# Patient Record
Sex: FEMALE | Race: WHITE | NOT HISPANIC OR LATINO | Employment: FULL TIME | URBAN - METROPOLITAN AREA
[De-identification: names, ages, dates, MRNs, and addresses within clinical notes are randomized per-mention and may not be internally consistent; named-entity substitution may affect disease eponyms.]

---

## 2017-09-15 ENCOUNTER — OFFICE VISIT (OUTPATIENT)
Dept: RETAIL CLINIC | Facility: CLINIC | Age: 37
End: 2017-09-15

## 2017-09-15 VITALS
WEIGHT: 209 LBS | BODY MASS INDEX: 38.46 KG/M2 | RESPIRATION RATE: 16 BRPM | HEIGHT: 62 IN | SYSTOLIC BLOOD PRESSURE: 104 MMHG | DIASTOLIC BLOOD PRESSURE: 64 MMHG | OXYGEN SATURATION: 99 % | TEMPERATURE: 98.5 F | HEART RATE: 87 BPM

## 2017-09-15 DIAGNOSIS — J06.9 UPPER RESPIRATORY INFECTION, VIRAL: Primary | ICD-10-CM

## 2017-09-15 LAB
EXPIRATION DATE: NORMAL
EXPIRATION DATE: NORMAL
FLUAV AG NPH QL: NEGATIVE
FLUBV AG NPH QL: NEGATIVE
INTERNAL CONTROL: NORMAL
INTERNAL CONTROL: NORMAL
Lab: NORMAL
Lab: NORMAL
S PYO AG THROAT QL: NEGATIVE

## 2017-09-15 PROCEDURE — 99213 OFFICE O/P EST LOW 20 MIN: CPT | Performed by: NURSE PRACTITIONER

## 2017-09-15 PROCEDURE — 87880 STREP A ASSAY W/OPTIC: CPT | Performed by: NURSE PRACTITIONER

## 2017-09-15 PROCEDURE — 87804 INFLUENZA ASSAY W/OPTIC: CPT | Performed by: NURSE PRACTITIONER

## 2017-09-15 RX ORDER — IBUPROFEN 800 MG/1
800 TABLET ORAL EVERY 6 HOURS PRN
Qty: 30 TABLET | Refills: 0 | Status: SHIPPED | OUTPATIENT
Start: 2017-09-15 | End: 2019-10-25

## 2017-09-15 RX ORDER — LANSOPRAZOLE 30 MG/1
30 CAPSULE, DELAYED RELEASE ORAL DAILY
COMMUNITY

## 2017-09-15 NOTE — PROGRESS NOTES
"Subjective   Roberta Casillas is a 37 y.o. female.   Chief Complaint   Patient presents with   • Sore Throat      headaches      Sore Throat    This is a new problem. The current episode started yesterday. The problem has been waxing and waning. There has been no fever. The pain is at a severity of 5/10. Pertinent negatives include no congestion, trouble swallowing or vomiting. Associated symptoms comments: Body aches. She has tried acetaminophen for the symptoms. The treatment provided no relief.        The following portions of the patient's history were reviewed and updated as appropriate: allergies, current medications, past family history, past medical history, past social history, past surgical history and problem list.    Current Outpatient Prescriptions:   •  Citalopram Hydrobromide (CELEXA PO), Take 40 mg by mouth., Disp: , Rfl:   •  Fexofenadine HCl (ALLEGRA ALLERGY PO), Take  by mouth., Disp: , Rfl:   •  lansoprazole (PREVACID) 30 MG capsule, Take 30 mg by mouth Daily., Disp: , Rfl:   •  fluticasone (FLONASE) 50 MCG/ACT nasal spray, 2 sprays into each nostril Daily., Disp: , Rfl:   •  ibuprofen (ADVIL,MOTRIN) 800 MG tablet, Take 1 tablet by mouth Every 6 (Six) Hours As Needed for Mild Pain , Fever or Headache., Disp: 30 tablet, Rfl: 0  •  Norgestim-Eth Estrad Triphasic (ORTHO TRI-CYCLEN, 28, PO), Take  by mouth., Disp: , Rfl:     Review of Systems   Constitutional: Positive for activity change, appetite change and fatigue. Negative for fever.   HENT: Positive for sore throat. Negative for congestion, postnasal drip, rhinorrhea, sinus pressure, sneezing and trouble swallowing.    Eyes: Negative for redness.   Respiratory: Negative.    Cardiovascular: Negative.    Gastrointestinal: Negative for nausea and vomiting.   Musculoskeletal: Positive for myalgias.   Skin: Negative for rash.     /64  Pulse 87  Temp 98.5 °F (36.9 °C)  Resp 16  Ht 62\" (157.5 cm)  Wt 209 lb (94.8 kg)  LMP 08/30/2017  SpO2 " 99%  BMI 38.23 kg/m2    Objective   Allergies   Allergen Reactions   • Lidocaine Hives       Physical Exam   Constitutional: She is oriented to person, place, and time. She appears well-developed and well-nourished.   HENT:   Head: Normocephalic.   Right Ear: External ear normal.   Left Ear: External ear normal.   Nose: Nose normal.   Mouth/Throat: Oropharynx is clear and moist. No oropharyngeal exudate.   Eyes: Conjunctivae are normal.   Neck: Normal range of motion. Neck supple.   Cardiovascular: Normal rate, regular rhythm and normal heart sounds.    Pulmonary/Chest: Effort normal and breath sounds normal. No respiratory distress. She has no wheezes. She has no rales. She exhibits no tenderness.   Musculoskeletal: Normal range of motion.   Neurological: She is alert and oriented to person, place, and time.   Skin: Skin is warm.   Psychiatric: She has a normal mood and affect. Her behavior is normal.   Vitals reviewed.      Assessment/Plan   Roberta was seen today for sore throat.    Diagnoses and all orders for this visit:    Upper respiratory infection, viral  -     POC Rapid Strep A  -     POC Influenza A / B    Other orders  -     ibuprofen (ADVIL,MOTRIN) 800 MG tablet; Take 1 tablet by mouth Every 6 (Six) Hours As Needed for Mild Pain , Fever or Headache.      Results for orders placed or performed in visit on 09/15/17   POC Rapid Strep A   Result Value Ref Range    Rapid Strep A Screen Negative Negative, VALID, INVALID, Not Performed    Internal Control Passed Passed    Lot Number [id number]     Expiration Date 2/23/19    POC Influenza A / B   Result Value Ref Range    Rapid Influenza A Ag Negative     Rapid Influenza B Ag Negative     Internal Control Passed Passed    Lot Number [id number]     Expiration Date 11/18           An After Visit Summary was printed, reviewed, and given to the patient. Understanding verbalized and agrees with treatment plan.  If no improvement or becomes worse, follow up with primary  or go to [facility]/ER.        September 15, 2017 7:09 PM

## 2017-09-15 NOTE — PATIENT INSTRUCTIONS
"Upper Respiratory Infection, Adult  Most upper respiratory infections (URIs) are caused by a virus. A URI affects the nose, throat, and upper air passages. The most common type of URI is often called \"the common cold.\"  HOME CARE   · Take medicines only as told by your doctor.  · Gargle warm saltwater or take cough drops to comfort your throat as told by your doctor.  · Use a warm mist humidifier or inhale steam from a shower to increase air moisture. This may make it easier to breathe.  · Drink enough fluid to keep your pee (urine) clear or pale yellow.  · Eat soups and other clear broths.  · Have a healthy diet.  · Rest as needed.  · Go back to work when your fever is gone or your doctor says it is okay.  ¨ You may need to stay home longer to avoid giving your URI to others.  ¨ You can also wear a face mask and wash your hands often to prevent spread of the virus.  · Use your inhaler more if you have asthma.  · Do not use any tobacco products, including cigarettes, chewing tobacco, or electronic cigarettes. If you need help quitting, ask your doctor.  GET HELP IF:  · You are getting worse, not better.  · Your symptoms are not helped by medicine.  · You have chills.  · You are getting more short of breath.  · You have brown or red mucus.  · You have yellow or brown discharge from your nose.  · You have pain in your face, especially when you bend forward.  · You have a fever.  · You have puffy (swollen) neck glands.  · You have pain while swallowing.  · You have white areas in the back of your throat.  GET HELP RIGHT AWAY IF:   · You have very bad or constant:    Headache.    Ear pain.    Pain in your forehead, behind your eyes, and over your cheekbones (sinus pain).    Chest pain.  · You have long-lasting (chronic) lung disease and any of the following:    Wheezing.    Long-lasting cough.    Coughing up blood.    A change in your usual mucus.  · You have a stiff neck.  · You have changes in your:    Vision.   "  Hearing.    Thinking.    Mood.  MAKE SURE YOU:   · Understand these instructions.  · Will watch your condition.  · Will get help right away if you are not doing well or get worse.     This information is not intended to replace advice given to you by your health care provider. Make sure you discuss any questions you have with your health care provider.     Document Released: 06/05/2009 Document Revised: 05/03/2016 Document Reviewed: 03/25/2015  [employer] Interactive Patient Education ©2017 [employer]

## 2018-10-26 ENCOUNTER — OFFICE VISIT (OUTPATIENT)
Dept: ENDOCRINOLOGY | Facility: CLINIC | Age: 38
End: 2018-10-26

## 2018-10-26 VITALS
HEART RATE: 72 BPM | WEIGHT: 222 LBS | HEIGHT: 62 IN | BODY MASS INDEX: 40.85 KG/M2 | DIASTOLIC BLOOD PRESSURE: 62 MMHG | OXYGEN SATURATION: 98 % | SYSTOLIC BLOOD PRESSURE: 110 MMHG

## 2018-10-26 DIAGNOSIS — E04.2 MULTINODULAR GOITER: Primary | ICD-10-CM

## 2018-10-26 LAB
T4 FREE SERPL-MCNC: 1.04 NG/DL (ref 0.89–1.76)
TSH SERPL DL<=0.05 MIU/L-ACNC: 1.3 MIU/ML (ref 0.35–5.35)

## 2018-10-26 PROCEDURE — 76536 US EXAM OF HEAD AND NECK: CPT | Performed by: INTERNAL MEDICINE

## 2018-10-26 PROCEDURE — 76942 ECHO GUIDE FOR BIOPSY: CPT | Performed by: INTERNAL MEDICINE

## 2018-10-26 PROCEDURE — 84439 ASSAY OF FREE THYROXINE: CPT | Performed by: INTERNAL MEDICINE

## 2018-10-26 PROCEDURE — 84443 ASSAY THYROID STIM HORMONE: CPT | Performed by: INTERNAL MEDICINE

## 2018-10-26 PROCEDURE — 10022 PR FINE NEEDLE ASP;W/IMAGING GUIDANCE: CPT | Performed by: INTERNAL MEDICINE

## 2018-10-26 PROCEDURE — 86376 MICROSOMAL ANTIBODY EACH: CPT | Performed by: INTERNAL MEDICINE

## 2018-10-26 PROCEDURE — 99244 OFF/OP CNSLTJ NEW/EST MOD 40: CPT | Performed by: INTERNAL MEDICINE

## 2018-10-26 NOTE — PROGRESS NOTES
Thyroid Nodule (Consult for Thyroid Nodule/U/S/FNA)    Subjective   Roberta Casillas is a 38 y.o. female. she is being seen for consultation today at the request of Blossom Branch APRN for evaluation of   Thyroid nodule dx in 2018. Ultrasound on 2018 was reviewed and showed 2 complex nodules 3 cm in size. FNA was recommended and patient was referred here.   She has been told about the nodules in the past and noticed that they are growing. Thyroid function was normal in 2018.         History of Present Illness  The following portions of the patient's history were reviewed and updated as appropriate: She  has a past medical history of Acid reflux; Allergic; Ear infection; Sinusitis; and Strep pharyngitis.  She  has a past surgical history that includes Appendectomy and  section.  Her family history includes Colon cancer in her maternal uncle; Diabetes in her maternal uncle; Heart disease in her father; Hyperlipidemia in her father; Thyroid disease in her maternal aunt, maternal grandmother, and mother.  She  reports that she has never smoked. She does not have any smokeless tobacco history on file. Her alcohol and drug histories are not on file.  She is allergic to lidocaine..    Medications:    Current Outpatient Prescriptions:   •  Citalopram Hydrobromide (CELEXA PO), Take 40 mg by mouth., Disp: , Rfl:   •  Fexofenadine HCl (ALLEGRA ALLERGY PO), Take  by mouth., Disp: , Rfl:   •  fluticasone (FLONASE) 50 MCG/ACT nasal spray, 2 sprays into each nostril Daily., Disp: , Rfl:   •  ibuprofen (ADVIL,MOTRIN) 800 MG tablet, Take 1 tablet by mouth Every 6 (Six) Hours As Needed for Mild Pain , Fever or Headache., Disp: 30 tablet, Rfl: 0  •  lansoprazole (PREVACID) 30 MG capsule, Take 30 mg by mouth Daily., Disp: , Rfl:   •  Norgestim-Eth Estrad Triphasic (ORTHO TRI-CYCLEN, 28, PO), Take  by mouth., Disp: , Rfl:     The following portions of the patient's history were reviewed and updated as appropriate:  "allergies, current medications, past family history, past medical history, past social history, past surgical history and problem list.    Review of Systems   Constitutional: Positive for fatigue and unexpected weight gain.   HENT: Positive for sore throat.    Eyes: Positive for itching.   Gastrointestinal: Positive for constipation and GERD.   Endocrine: Positive for heat intolerance.   Neurological: Positive for weakness.   Hematological: Bruises/bleeds easily.   Psychiatric/Behavioral: The patient is nervous/anxious.    All other systems reviewed and are negative.      Objective   Blood pressure 110/62, pulse 72, height 157.5 cm (62\"), weight 101 kg (222 lb), SpO2 98 %.   Physical Exam   Constitutional: She is oriented to person, place, and time. She appears well-developed and well-nourished.   HENT:   Head: Normocephalic and atraumatic.   Eyes: Pupils are equal, round, and reactive to light. Conjunctivae and EOM are normal.   Neck: Thyromegaly (right 3 cm non tender nodule palpated) present.   Cardiovascular: Normal rate, regular rhythm, normal heart sounds and intact distal pulses.    Pulmonary/Chest: Effort normal and breath sounds normal.   Musculoskeletal: She exhibits no edema.   Lymphadenopathy:     She has no cervical adenopathy.   Neurological: She is alert and oriented to person, place, and time.   Psychiatric: She has a normal mood and affect. Thought content normal.         Results for orders placed or performed in visit on 10/26/18   TSH   Result Value Ref Range    TSH 1.304 0.350 - 5.350 mIU/mL   Thyroid Peroxidase Antibody   Result Value Ref Range    Thyroid Peroxidase Antibody 9 0 - 34 IU/mL   T4, Free   Result Value Ref Range    Free T4 1.04 0.89 - 1.76 ng/dL             Thyroid ultrasound 10/26/18              Real time high resolution imaging of the thyroid gland was performed in transverse and longitudinal planes.   Previous images were reviewed and compared to the current appearance to assess " stability.       The right lobe measured 6.36 cm L x 1.37 cm AP x 2.51 cm in TV dimension.    The isthmus measured 0.43 cm in thickness.    The left thyroid lobe measured 4.34 cm L x 0.71 cm AP x 1.21 cm in TV dimension.    Thyroid gland is prominent and contains nodules in the right lobe.    Nodule 1   is located in the right mid lobe and measures 2.99 x 2.1 x 2.54 cm (L X AP X TV).  This nodule is complex solid, heterogeneous, hypoechoic with well-defined margins, and Grade II vascularity on Color Flow Doppler.  It has artifacts:  coma-tail calcifications  The nodule appears to consist of several smaller nodules. It is diffiuclt to differentiate the borders between 2.    No pathologic lymph nodes were seen.    Thyroid Biopsy Procedure Note      Indications: Thyroid Nodule    Anesthesia: ethyl chloride spray    Procedure Details   The procedure, risks and complications have been discussed in detail (including, but not limited to airway compromise, infection, bleeding) with the patient, and the patient has signed consent to the procedure    The neck was prepped in sterile fashion..   Biopsy site: right.  With ultrasound guidance of needle localization,   5  aspirates were obtained with sterile 27 g. Needles.  10 slides were prepared with both air drying and immediate cytology fixative.    A single container with 20 ml of cytology fixative was also used to collect needle and syringe washings.   Specimens sent to pathology.   Additional specimen is collected for molecular analysis and will be sent in the event of undetermined biopsy.     The patient tolerated the procedure without difficulty or complications.       Assessment: Right complex dominant nodule biopsied with u/s guidance.         Assessment/Plan:    Problem List Items Addressed This Visit        Endocrine    Multinodular goiter - Primary    Overview     FNA and aspiration of the cyst 10/26/2018.         Relevant Orders    US Thyroid (Completed)    TSH  (Completed)    Thyroid Peroxidase Antibody (Completed)    T4, Free (Completed)          Old records were reviewed and summarized in HPI section of the note.  Previous ultrasound report was reviewed and compared to current finding.   FNA of the nodule performed.   Thyroid function is repeated and normal TPO ab negative.     Follow-up in 1 year if biopsy is benign/

## 2018-10-28 LAB — THYROPEROXIDASE AB SERPL-ACNC: 9 IU/ML (ref 0–34)

## 2018-11-09 ENCOUNTER — OFFICE VISIT (OUTPATIENT)
Dept: RETAIL CLINIC | Facility: CLINIC | Age: 38
End: 2018-11-09

## 2018-11-09 DIAGNOSIS — Z23 NEED FOR VACCINATION: Primary | ICD-10-CM

## 2018-11-09 NOTE — PROGRESS NOTES
Subjective   Roberta Casillas is a 38 y.o. female.     Reason for Appointment  Vaccine    History of Present Illness  Roberta Casillas requests Influenza vaccine.  She denies current acute illness, denies adverse reaction to vaccines in the past.  She denies allergy to eggs, latex and any component to vaccines.  She denies history of Guillain [place].    Assessments  Roberta was seen today for immunizations.    Diagnoses and all orders for this visit:    Need for vaccination  -     Fluarix/Fluzone/Afluria/FluLaval ([id number])        VIS given.  Pt advised that the most common post vaccine complaint is that of a sore arm at the injection site.  Pt also advised that this is a normal reaction to this vaccine.  If there are other concerns that arise, the patient has been advised to call the clinic or return to the clinic. If the pt has difficulty breathing, the patient has been advised to go to the ER.  The pt has stated understanding.    This document has been electronically signed by PRITI Chase November 9, 2018 4:49 PM

## 2019-10-25 ENCOUNTER — OFFICE VISIT (OUTPATIENT)
Dept: ENDOCRINOLOGY | Facility: CLINIC | Age: 39
End: 2019-10-25

## 2019-10-25 VITALS
WEIGHT: 217.3 LBS | HEIGHT: 62 IN | SYSTOLIC BLOOD PRESSURE: 110 MMHG | BODY MASS INDEX: 39.99 KG/M2 | OXYGEN SATURATION: 98 % | DIASTOLIC BLOOD PRESSURE: 60 MMHG | HEART RATE: 58 BPM

## 2019-10-25 DIAGNOSIS — E04.2 MULTINODULAR GOITER: Primary | ICD-10-CM

## 2019-10-25 PROCEDURE — 99213 OFFICE O/P EST LOW 20 MIN: CPT | Performed by: INTERNAL MEDICINE

## 2019-10-25 PROCEDURE — 76536 US EXAM OF HEAD AND NECK: CPT | Performed by: INTERNAL MEDICINE

## 2019-10-25 RX ORDER — METHOCARBAMOL 500 MG/1
TABLET, FILM COATED ORAL
Refills: 0 | COMMUNITY
Start: 2019-09-29

## 2019-10-25 RX ORDER — CHOLECALCIFEROL (VITAMIN D3) 125 MCG
5000 CAPSULE ORAL DAILY
Refills: 0 | COMMUNITY
Start: 2019-08-08

## 2019-10-25 NOTE — PROGRESS NOTES
Goiter (Follow Up & U/S:  Feels like it may be a little bigger)    Subjective   Roberta Casillas is a 39 y.o. female. she is being seen for follow-up of   Thyroid nodule dx in 2018. Ultrasound on 2018 was reviewed and showed 2 complex nodules 3 cm in size. FNA showed benign nodule.   . Thyroid function was normal.         Goiter   Associated symptoms include fatigue, a sore throat and weakness.     The following portions of the patient's history were reviewed and updated as appropriate: She  has a past medical history of Acid reflux, Allergic, Ear infection, Sinusitis, and Strep pharyngitis.  She  has a past surgical history that includes Appendectomy and  section.  Her family history includes Colon cancer in her maternal uncle; Diabetes in her maternal uncle; Heart disease in her father; Hyperlipidemia in her father; Thyroid disease in her maternal aunt, maternal grandmother, and mother.  She  reports that she has never smoked. She does not have any smokeless tobacco history on file. Her alcohol and drug histories are not on file.  She is allergic to lidocaine..    Medications:    Current Outpatient Medications:   •  Fexofenadine HCl (ALLEGRA ALLERGY PO), Take  by mouth., Disp: , Rfl:   •  fluticasone (FLONASE) 50 MCG/ACT nasal spray, 2 sprays into each nostril Daily., Disp: , Rfl:   •  lansoprazole (PREVACID) 30 MG capsule, Take 30 mg by mouth Daily., Disp: , Rfl:   •  methocarbamol (ROBAXIN) 500 MG tablet, , Disp: , Rfl: 0  •  Norgestim-Eth Estrad Triphasic (ORTHO TRI-CYCLEN, 28, PO), Take  by mouth., Disp: , Rfl:   •  RA VITAMIN D-3 125 MCG (5000 UT) capsule, Take 5,000 Units by mouth Daily., Disp: , Rfl: 0    The following portions of the patient's history were reviewed and updated as appropriate: allergies, current medications, past family history, past medical history, past social history, past surgical history and problem list.    Review of Systems   Constitutional: Positive for fatigue and  "unexpected weight gain.   HENT: Positive for sore throat.    Eyes: Positive for itching.   Gastrointestinal: Positive for constipation and GERD.   Endocrine: Positive for heat intolerance.   Neurological: Positive for weakness.   Hematological: Bruises/bleeds easily.   Psychiatric/Behavioral: The patient is nervous/anxious.    All other systems reviewed and are negative.      Objective   Blood pressure 110/60, pulse 58, height 157.5 cm (62\"), weight 98.6 kg (217 lb 4.8 oz), SpO2 98 %.   Physical Exam   Constitutional: She is oriented to person, place, and time. She appears well-developed and well-nourished.   HENT:   Head: Normocephalic and atraumatic.   Eyes: Conjunctivae and EOM are normal. Pupils are equal, round, and reactive to light.   Neck: Thyromegaly (right 3 cm non tender nodule palpated) present.   Cardiovascular: Normal rate, regular rhythm, normal heart sounds and intact distal pulses.   Pulmonary/Chest: Effort normal and breath sounds normal.   Musculoskeletal: She exhibits no edema.   Lymphadenopathy:     She has no cervical adenopathy.   Neurological: She is alert and oriented to person, place, and time.   Psychiatric: She has a normal mood and affect. Thought content normal.         Results for orders placed or performed in visit on 10/26/18   TSH   Result Value Ref Range    TSH 1.304 0.350 - 5.350 mIU/mL   Thyroid Peroxidase Antibody   Result Value Ref Range    Thyroid Peroxidase Antibody 9 0 - 34 IU/mL   T4, Free   Result Value Ref Range    Free T4 1.04 0.89 - 1.76 ng/dL             Thyroid ultrasound 10/26/19              Real time high resolution imaging of the thyroid gland was performed in transverse and longitudinal planes.   Previous images were reviewed and compared to the current appearance to assess stability.       The right lobe measured 5.43 cm L x 2.76 cm AP x 2.78 cm in TV dimension.    The isthmus measured 0.56 cm in thickness.    The left thyroid lobe measured 4.39 cm L x 1.2 cm AP " x 1.39 cm in TV dimension.    Thyroid gland is prominent and contains nodules in the right lobe.    Nodule 1   is located in the right mid lobe and measures 3.32 x 2.51 x 2.68 cm (L X AP X TV).  This nodule is complex solid, heterogeneous, hypoechoic with well-defined margins, and Grade II vascularity on Color Flow Doppler.  It has artifacts:  coma-tail calcifications  The nodule appears to consist of several smaller nodules. It is diffiuclt to differentiate the borders between 2.    No pathologic lymph nodes were seen.    Assessment: Right complex dominant nodule is slowly increased in size. FNA was benign last visit.         Assessment/Plan:    Problem List Items Addressed This Visit        Endocrine    Multinodular goiter - Primary    Overview     FNA and aspiration of the cyst 10/26/2018. Cytology showed benign nodule.          Relevant Orders    US Thyroid (Completed)        I have reviewed therapy options with the patient and explained that if nodule continues to grow surgical removal could be an optimal therapy.   Patient would like for now to continue conservative therapy and we will return to this conversation at her next visit.   Follow-up in 1 year

## 2020-09-29 ENCOUNTER — TELEPHONE (OUTPATIENT)
Dept: ENDOCRINOLOGY | Facility: CLINIC | Age: 40
End: 2020-09-29

## 2020-09-29 NOTE — TELEPHONE ENCOUNTER
Dr. Rivera - this patient had an appointment scheduled on 10/28 but you will be on a Zoom meeting at that time. This is for a follow up + us and the patient is wanting to know if she can be worked in next week since she will be on Fall break for school. Please advise. Thanks.

## 2020-10-07 ENCOUNTER — TELEPHONE (OUTPATIENT)
Dept: ENDOCRINOLOGY | Facility: CLINIC | Age: 40
End: 2020-10-07

## 2020-10-07 ENCOUNTER — OFFICE VISIT (OUTPATIENT)
Dept: ENDOCRINOLOGY | Facility: CLINIC | Age: 40
End: 2020-10-07

## 2020-10-07 VITALS
WEIGHT: 223.5 LBS | SYSTOLIC BLOOD PRESSURE: 112 MMHG | HEART RATE: 84 BPM | BODY MASS INDEX: 41.13 KG/M2 | HEIGHT: 62 IN | DIASTOLIC BLOOD PRESSURE: 62 MMHG | OXYGEN SATURATION: 98 %

## 2020-10-07 DIAGNOSIS — E04.2 MULTINODULAR GOITER: Primary | ICD-10-CM

## 2020-10-07 PROCEDURE — 99213 OFFICE O/P EST LOW 20 MIN: CPT | Performed by: INTERNAL MEDICINE

## 2020-10-07 PROCEDURE — 76536 US EXAM OF HEAD AND NECK: CPT | Performed by: INTERNAL MEDICINE

## 2020-10-07 RX ORDER — SERTRALINE HYDROCHLORIDE 25 MG/1
25 TABLET, FILM COATED ORAL DAILY
COMMUNITY
Start: 2020-07-29

## 2020-10-07 NOTE — PROGRESS NOTES
Goiter (Follow Up)    Subjective   Roberta Casillas is a 40 y.o. female. she is being seen for follow-up of   Thyroid nodule dx in 2018. Ultrasound on 2018 was reviewed and showed  complex nodule 3 cm in size. FNA showed benign nodule.  The nodule remained stable since then   Thyroid function was normal. She had it done recently with PCP and we requested the records.       [facility]   The following portions of the patient's history were reviewed and updated as appropriate: She  has a past medical history of Acid reflux, Allergic, Ear infection, Sinusitis, and Strep pharyngitis.  She  has a past surgical history that includes Appendectomy and  section.  Her family history includes Colon cancer in her maternal uncle; Diabetes in her maternal uncle; Heart disease in her father; Hyperlipidemia in her father; Thyroid disease in her maternal aunt, maternal grandmother, and mother.  She  reports that she has never smoked. She does not have any smokeless tobacco history on file. No history on file for alcohol and drug.  She is allergic to lidocaine..    Medications:    Current Outpatient Medications:   •  Fexofenadine HCl (ALLEGRA ALLERGY PO), Take  by mouth., Disp: , Rfl:   •  fluticasone (FLONASE) 50 MCG/ACT nasal spray, 2 sprays into each nostril Daily., Disp: , Rfl:   •  lansoprazole (PREVACID) 30 MG capsule, Take 30 mg by mouth Daily., Disp: , Rfl:   •  methocarbamol (ROBAXIN) 500 MG tablet, , Disp: , Rfl: 0  •  Norgestim-Eth Estrad Triphasic (ORTHO TRI-CYCLEN, 28, PO), Take  by mouth., Disp: , Rfl:   •  RA VITAMIN D-3 125 MCG (5000 UT) capsule, Take 5,000 Units by mouth Daily., Disp: , Rfl: 0  •  sertraline (ZOLOFT) 25 MG tablet, Take 25 mg by mouth Daily., Disp: , Rfl:     The following portions of the patient's history were reviewed and updated as appropriate: allergies, current medications, past family history, past medical history, past social history, past surgical history and problem list.    Review of  "Systems   Constitutional: Positive for fatigue and unexpected weight gain.   Eyes: Positive for itching.   Gastrointestinal: Positive for constipation and GERD.   Endocrine: Positive for heat intolerance.   Neurological: Positive for weakness.   Hematological: Bruises/bleeds easily.   Psychiatric/Behavioral: The patient is nervous/anxious.    All other systems reviewed and are negative.      Objective   Blood pressure 112/62, pulse 84, height 157.5 cm (62\"), weight 101 kg (223 lb 8 oz), SpO2 98 %.   Physical Exam   Constitutional: She is oriented to person, place, and time. She appears well-developed and well-nourished.   HENT:   Head: Normocephalic and atraumatic.   Eyes: Pupils are equal, round, and reactive to light. Conjunctivae are normal.   Neck: Thyromegaly (right 3 cm non tender nodule palpated) present.   Cardiovascular: Normal rate, regular rhythm and normal heart sounds.   Pulmonary/Chest: Effort normal and breath sounds normal.   Lymphadenopathy:     She has no cervical adenopathy.   Neurological: She is alert and oriented to person, place, and time.   Psychiatric: Thought content normal.         Results for orders placed or performed in visit on 10/26/18   TSH    Specimen: Blood   Result Value Ref Range    TSH 1.304 0.350 - 5.350 mIU/mL   Thyroid Peroxidase Antibody    Specimen: Blood   Result Value Ref Range    Thyroid Peroxidase Antibody 9 0 - 34 IU/mL   T4, Free    Specimen: Blood   Result Value Ref Range    Free T4 1.04 0.89 - 1.76 ng/dL             Thyroid ultrasound 10/07/20              Real time high resolution imaging of the thyroid gland was performed in transverse and longitudinal planes.   Previous images were reviewed and compared to the current appearance to assess stability.       The right lobe measured 5.47 cm L x 2.42 cm AP x 2.66 cm in TV dimension.    The isthmus measured 0.42 cm in thickness.    The left thyroid lobe measured 4.16 cm L x 1.25 cm AP x 1.08 cm in TV " dimension.    Thyroid gland is prominent and contains nodules in the right lobe.    Nodule 1   is located in the right mid lobe and measures 3.34 x 2.11 x 2.49 cm (L X AP X TV).  This nodule is complex solid, heterogeneous, hypoechoic with well-defined margins, and Grade II vascularity on Color Flow Doppler.  It has artifacts:  coma-tail calcifications  The nodule appears to consist of several smaller nodules. It is diffiuclt to differentiate the borders between 2.    No pathologic lymph nodes were seen.    Assessment: Right complex dominant nodule is slowly increased in size. FNA was benign last visit.         Assessment/Plan:    Problem List Items Addressed This Visit        Endocrine    Multinodular goiter - Primary    Overview     FNA and aspiration of the cyst 10/26/2018. Cytology showed benign nodule.          Relevant Orders    US Thyroid (Completed)    Ambulatory Referral to General Surgery        I have reviewed therapy options with the patient and explained that if nodule continues to grow surgical removal could be an optimal therapy.   Patient would like to see DR Mccoy or DR Gu and have surgery before the end of the year if possible.   Follow-up in 6 months

## 2020-10-07 NOTE — TELEPHONE ENCOUNTER
----- Message from Sherita WHITE MD sent at 10/7/2020  1:49 PM EDT -----  Please obtain records from PCP with recent labs.

## 2021-02-04 ENCOUNTER — TELEPHONE (OUTPATIENT)
Dept: ENDOCRINOLOGY | Facility: CLINIC | Age: 41
End: 2021-02-04

## 2021-02-04 NOTE — TELEPHONE ENCOUNTER
----- Message from Sherita WHITE MD sent at 2/4/2021  9:40 AM EST -----  Could you please schedule this patient on Feb 11 at 10:30 am for 30 min? If she cannot come that day, please let me know of her availability.   And regarding my email request,  last 2 patients from Feb 25 afternoon could be moved to 12:15 and 12:30 February 11.

## 2021-02-12 ENCOUNTER — OFFICE VISIT (OUTPATIENT)
Dept: ENDOCRINOLOGY | Facility: CLINIC | Age: 41
End: 2021-02-12

## 2021-02-12 VITALS
BODY MASS INDEX: 42.47 KG/M2 | OXYGEN SATURATION: 98 % | HEIGHT: 62 IN | WEIGHT: 230.8 LBS | SYSTOLIC BLOOD PRESSURE: 110 MMHG | TEMPERATURE: 97.5 F | DIASTOLIC BLOOD PRESSURE: 66 MMHG | HEART RATE: 96 BPM

## 2021-02-12 DIAGNOSIS — C73 FOLLICULAR THYROID CANCER (HCC): Primary | ICD-10-CM

## 2021-02-12 DIAGNOSIS — E89.0 POSTOPERATIVE HYPOTHYROIDISM: ICD-10-CM

## 2021-02-12 PROBLEM — E04.2 MULTINODULAR GOITER: Status: RESOLVED | Noted: 2018-10-26 | Resolved: 2021-02-12

## 2021-02-12 PROCEDURE — 99215 OFFICE O/P EST HI 40 MIN: CPT | Performed by: INTERNAL MEDICINE

## 2021-02-12 RX ORDER — LEVOTHYROXINE SODIUM 0.15 MG/1
TABLET ORAL
COMMUNITY
Start: 2021-02-04 | End: 2021-06-03

## 2021-02-12 RX ORDER — LIOTHYRONINE SODIUM 25 UG/1
12.5 TABLET ORAL DAILY
Qty: 30 TABLET | Refills: 1 | Status: SHIPPED | OUTPATIENT
Start: 2021-02-12 | End: 2021-06-03

## 2021-02-12 NOTE — PATIENT INSTRUCTIONS
PREPERATION FOR I-131 WHOLE BODY SCANS  FOR THYROID CANCER FOLLOW UP         02/12/21     Roberta ALFRED Vinny 1980       1)   Six weeks before the scan 2/13/2021 discontinue levothyroxine (Synthroid or Levoxyl)             and start Cytomel one day later on 2/14/2021, (1/2 tablet twice a day)         every 12 hrs      2)   2 weeks before the scan March 8, 2021. Discontinue the Cytomel     Start low-iodine diet.    Please note that you will become significantly hypothyroid at this time and you  you are advised to avoid driving or operating dangerous machinery.      3)   You testing will occur on the week of March 22  You will have appointments in the nuclear medicine department on 03/22 and 03/24/2021 for whole body scan and therapy on 3/25/2021. You would also need to have important blood work during this week.         4)   I will call you in the afternoon of March 24 to discuss the results and decide the dose of therapy          Instructions after hypothyroid preparation for I-131 treatment.        1. Please resume your Synthroid on 03/26/2021 (24 hours after therapy)  Take 150 mcg daily (first thing in the morning on a empty stomach)    2. Stop low iodine diet and resume NORMAL DIET on the same day (24 hours after therapy).  3. In addition, take Cytomel (liothyronine) tab (25 mcg each) for a short time using following schedule:  • For 3 days take 1/2 tab twice daily  • 3 days take one half tablet daily in the morning  • Then stop taking Cytomel  Please note that should you have any thyrotoxic symptoms (heart racing, nervousness, tremors etc) then you should stop the Cytomel earlier.    Please maintain radiation  safety precautions for the next 10-14 days. Do not exchange body fluids with anyone (no sex, wet kissing, sharing utensils etc). You may use normal eating utensils and dishes/cups, but wash them before anyone else handles them. Dispose of your toothbrush after 14 days. Try to place a little bit of  distance between yourself and others. One useful technique used to imagine that you have “strep throat”; the distance that others should keep from you would be similar. Do not sleep in the same bed as others for this time. There is no need for these precautions after 14 days. Do not operate a motor vehicle for at least 10 days.Remember you post- therapy whole-body scan  in Nuclear Medicine is within a week after therapy.    Please call with any questions [phone]      Sherita Rivera MD

## 2021-02-12 NOTE — PROGRESS NOTES
Goiter (Surgery Follow UP)    Subjective   Roberta Casillas is a 40 y.o. female. she is being seen for follow-up of   Thyroid nodule dx in 5/2018. Ultrasound on 5/2018 was reviewed and showed  complex nodule 3 cm in size. FNA showed benign nodule.  The nodule remained stable but because of the size she was referred for surgery to Dr Mccoy.   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  Postop operatively she took calcium and calcitriol and both medications were discontinued a week after surgery.  The case was discussed with Dr Mccoy and the tumor is 1.8 cm with vascular invasion. Tumor board consensus was adjuvant I-131 therapy.    Patient has started levothyroxine 150 mcg.   Her postop period was complicated by infection at the incision site. Second surgery was uneventful and the incision is healing well.  She presented for discussion of adjuvant radioactive iodine therapy.     Medications:    Current Outpatient Medications:   •  Fexofenadine HCl (ALLEGRA ALLERGY PO), Take  by mouth., Disp: , Rfl:   •  fluticasone (FLONASE) 50 MCG/ACT nasal spray, 2 sprays into each nostril Daily., Disp: , Rfl:   •  lansoprazole (PREVACID) 30 MG capsule, Take 30 mg by mouth Daily., Disp: , Rfl:   •  levothyroxine (SYNTHROID, LEVOTHROID) 150 MCG tablet, , Disp: , Rfl:   •  methocarbamol (ROBAXIN) 500 MG tablet, , Disp: , Rfl: 0  •  Norgestim-Eth Estrad Triphasic (ORTHO TRI-CYCLEN, 28, PO), Take  by mouth., Disp: , Rfl:   •  RA VITAMIN D-3 125 MCG (5000 UT) capsule, Take 5,000 Units by mouth Daily., Disp: , Rfl: 0  •  sertraline (ZOLOFT) 25 MG tablet, Take 25 mg by mouth Daily., Disp: , Rfl:   •  liothyronine (CYTOMEL) 25 MCG tablet, Take 0.5 tablets by mouth Daily., Disp: 30 tablet, Rfl: 1      Review of Systems   Constitutional: Positive for fatigue and unexpected weight gain.   Eyes: Positive for itching.   Gastrointestinal: Positive for constipation and  "GERD.   Endocrine: Positive for heat intolerance.   Neurological: Positive for weakness.   Hematological: Bruises/bleeds easily.   Psychiatric/Behavioral: The patient is nervous/anxious.    All other systems reviewed and are negative.      Objective   Blood pressure 110/66, pulse 96, temperature 97.5 °F (36.4 °C), height 157.5 cm (62\"), weight 105 kg (230 lb 12.8 oz), SpO2 98 %.   Physical Exam   Constitutional: She is oriented to person, place, and time. She appears well-developed and well-nourished.   HENT:   Head: Normocephalic and atraumatic.   Eyes: Pupils are equal, round, and reactive to light. Conjunctivae are normal.   Neck:   Postop scar is healing. No infiltration, mild erythema seen.    Cardiovascular: Normal rate, regular rhythm and normal heart sounds.   Pulmonary/Chest: Effort normal and breath sounds normal.   Neurological: She is alert and oriented to person, place, and time.   Psychiatric: Thought content normal.         Results for orders placed or performed in visit on 10/26/18   TSH    Specimen: Blood   Result Value Ref Range    TSH 1.304 0.350 - 5.350 mIU/mL   Thyroid Peroxidase Antibody    Specimen: Blood   Result Value Ref Range    Thyroid Peroxidase Antibody 9 0 - 34 IU/mL   T4, Free    Specimen: Blood   Result Value Ref Range    Free T4 1.04 0.89 - 1.76 ng/dL           Assessment/Plan:    Problem List Items Addressed This Visit        Other    Follicular thyroid cancer (CMS/HCC) - Primary    Relevant Medications    levothyroxine (SYNTHROID, LEVOTHROID) 150 MCG tablet    liothyronine (CYTOMEL) 25 MCG tablet    Other Relevant Orders    TSH    Thyroglobulin + Thyroglobulin Antibody ([facility])    NM Thyroid Cancer Metastatic Scan Whole Body    Pregnancy, Urine - Urine, Clean Catch    NM Thyroid Therapy Ablation Basic      Other Visit Diagnoses     Postoperative hypothyroidism        Relevant Medications    levothyroxine (SYNTHROID, LEVOTHROID) 150 MCG tablet    liothyronine (CYTOMEL) 25 MCG tablet    "   Newly diagnosed follicular thyroid carcinoma. 1.8 cm with vascular invasion. 2nd surgery didn't reveal any additional metastatic l/nodes.   Adjuvant I-131 therapy is a good choice of therapy and we have discussed the case with Dr Mccoy.   I have discussed with the patient at length the nature of the disease, treatment plan and prognosis. Low iodine diet and hypothyroid preparation were discussed in details, cytomel prescription given.   the patient will stop cytomel and start low iodine diet 2 weeks before the procedure.   The examples of low iodine meal suggestions given and posttreatment instruction provided.   The patient will have whole body scan and I -131 therapy on the week of March 22.   I will also obtain comprehensive TG and TSH during this week. I will see her the day after her scan to discuss results and determine therapy dose.   The dose of  mCi is considered given no additional findings on the WBS found.  Radioactive iodine precautions were reviewed with the patient and will need to be followed for 2 weeks after the radioiodine therapy. She will be able to resume normal diet and start synthroid 24 hours after therapy.   I will repeat thyroid levels on levothyroxine in 2 months.   Follow-up in 2-3  months

## 2021-02-23 ENCOUNTER — TELEPHONE (OUTPATIENT)
Dept: ENDOCRINOLOGY | Facility: CLINIC | Age: 41
End: 2021-02-23

## 2021-02-23 NOTE — TELEPHONE ENCOUNTER
Pt would like a return call regarding upcoming testing, states things are not going as they were discussed     [phone]

## 2021-02-23 NOTE — TELEPHONE ENCOUNTER
Returned patient call, she stated that the scan and nuc med does not have the scanned scheduled?  I was a little confused.  Can you please help clarify this for me and see if Dr. Rivera can send order for 2 scans or 2 doses

## 2021-02-26 ENCOUNTER — TELEPHONE (OUTPATIENT)
Dept: ENDOCRINOLOGY | Facility: CLINIC | Age: 41
End: 2021-02-26

## 2021-03-03 NOTE — TELEPHONE ENCOUNTER
PT CALLED BACK ABOUT THIS ENCOUNTER. SHE STATED THAT SHE HAS BEEN CALLING ALL WEEK SINCE THE 22ND AND THAT SHE IS GETTING UPSET WITH US. SHE WOULD LIKE A CALL BACK EXPLAINING THE SITUATION. 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

## 2021-03-03 NOTE — TELEPHONE ENCOUNTER
I have discussed with the patient and clarified the correct schedule. Could you please reschedule it accordingly?    March 22 and 24 - diagnostic WBS in nuclear medicine and labs (Thyroglobulin and TSH)\  March 25 - therapy dose   1 week later - posttreatment scan.     Thank you

## 2021-03-04 DIAGNOSIS — C73 FOLLICULAR THYROID CANCER (HCC): Primary | ICD-10-CM

## 2021-03-04 NOTE — TELEPHONE ENCOUNTER
Dr. Rivera- you will need to put in another order for central scheduling to be able to schedule the way that you are asking. They need an order for whole body dose and scan, please.    Thank you!

## 2021-03-08 ENCOUNTER — TELEPHONE (OUTPATIENT)
Dept: ENDOCRINOLOGY | Facility: CLINIC | Age: 41
End: 2021-03-08

## 2021-03-08 NOTE — TELEPHONE ENCOUNTER
PT CALLED FRUSTRATED THAT HER APPT FOR HER NUCLEAR SCAN FOR HER THYROID IS NOT SCHEDULED CORRECTLY. PLEASE LOOK INTO THIS AND REACH OUT TO PT AT EARLIEST CONVENIENCE. THANK YOU

## 2021-03-10 NOTE — TELEPHONE ENCOUNTER
I KNOW THAT THE SCHEDULED APPTS ARE NOT SCHEDULED CORRECTLY. I HAVE TO WAIT FOR DU[facility] AT [facility] TO BE IN THE OFFICE-HE IS THE ONLY ONE THAT CAN SCHEDULE THEM THE WAY THAT DR. ALONZO WANTS THEM SCHEDULED. IF PT CALLS TO CHECK ON THIS AGAIN BEFORE I HAVE THE CHANCE TO GET THEM RESCHEDULED, PLEASE EXPLAIN TO HER THAT WE ARE AWARE AND TRYING TO GET IT TAKEN CARE OF.

## 2021-03-22 ENCOUNTER — APPOINTMENT (OUTPATIENT)
Dept: NUCLEAR MEDICINE | Facility: HOSPITAL | Age: 41
End: 2021-03-22

## 2021-03-22 ENCOUNTER — HOSPITAL ENCOUNTER (OUTPATIENT)
Dept: NUCLEAR MEDICINE | Facility: HOSPITAL | Age: 41
Discharge: HOME | End: 2021-03-22

## 2021-03-22 ENCOUNTER — LAB (OUTPATIENT)
Dept: LAB | Facility: HOSPITAL | Age: 41
End: 2021-03-22

## 2021-03-22 DIAGNOSIS — C73 FOLLICULAR THYROID CANCER (HCC): ICD-10-CM

## 2021-03-22 LAB
B-HCG UR QL: NEGATIVE
HCG INTACT+B SERPL-ACNC: <0.5 MIU/ML

## 2021-03-22 PROCEDURE — 78018 THYROID MET IMAGING BODY: CPT

## 2021-03-22 PROCEDURE — 84702 CHORIONIC GONADOTROPIN TEST: CPT | Performed by: RADIOLOGY

## 2021-03-22 PROCEDURE — 0 SODIUM IODIDE CAPSULE: Performed by: INTERNAL MEDICINE

## 2021-03-22 PROCEDURE — 81025 URINE PREGNANCY TEST: CPT

## 2021-03-22 PROCEDURE — A9528 IODINE I-131 IODIDE CAP, DX: HCPCS | Performed by: INTERNAL MEDICINE

## 2021-03-22 PROCEDURE — 86800 THYROGLOBULIN ANTIBODY: CPT | Performed by: INTERNAL MEDICINE

## 2021-03-22 PROCEDURE — 84432 ASSAY OF THYROGLOBULIN: CPT | Performed by: INTERNAL MEDICINE

## 2021-03-22 RX ADMIN — SODIUM IODIDE I 131 1 CAPSULE: 100 CAPSULE ORAL at 09:57

## 2021-03-24 ENCOUNTER — HOSPITAL ENCOUNTER (OUTPATIENT)
Dept: NUCLEAR MEDICINE | Facility: HOSPITAL | Age: 41
Discharge: HOME | End: 2021-03-24

## 2021-03-24 ENCOUNTER — OFFICE VISIT (OUTPATIENT)
Dept: ENDOCRINOLOGY | Facility: CLINIC | Age: 41
End: 2021-03-24

## 2021-03-24 VITALS
SYSTOLIC BLOOD PRESSURE: 122 MMHG | DIASTOLIC BLOOD PRESSURE: 76 MMHG | BODY MASS INDEX: 42.76 KG/M2 | TEMPERATURE: 97.7 F | HEART RATE: 81 BPM | OXYGEN SATURATION: 98 % | HEIGHT: 62 IN | WEIGHT: 232.4 LBS

## 2021-03-24 DIAGNOSIS — C73 FOLLICULAR THYROID CANCER (HCC): Primary | ICD-10-CM

## 2021-03-24 DIAGNOSIS — E89.0 POSTOPERATIVE HYPOTHYROIDISM: ICD-10-CM

## 2021-03-24 PROCEDURE — 99215 OFFICE O/P EST HI 40 MIN: CPT | Performed by: INTERNAL MEDICINE

## 2021-03-24 NOTE — PROGRESS NOTES
Thyroid Cancer (Follow UP:  Patient had thyroid scan today,. and is currently having a Panic Attack. Thinks combo of being off medication,  and fear , palpitations, feeling shaky inside )    Subjective   Roberta Casillas is a 40 y.o. female. she is being seen for follow-up of   Thyroid nodule dx in 5/2018. Ultrasound on 5/2018 was reviewed and showed  complex nodule 3 cm in size. FNA showed benign nodule.  The nodule remained stable but because of the size she was referred for surgery to Dr Mccoy.   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  Postop operatively she took calcium and calcitriol and both medications were discontinued a week after surgery.  The case was discussed with Dr Mccoy and the tumor is 1.8 cm with vascular invasion. Tumor board consensus was adjuvant I-131 therapy.    Patient has started levothyroxine 150 mcg after surgery, she is in hypothyroid prep now and presented to discuss results of WBS and plan therapy. She c/o sensation of internal shakiness and palpitations.      Medications:    Current Outpatient Medications:   •  Fexofenadine HCl (ALLEGRA ALLERGY PO), Take  by mouth., Disp: , Rfl:   •  fluticasone (FLONASE) 50 MCG/ACT nasal spray, 2 sprays into each nostril Daily., Disp: , Rfl:   •  lansoprazole (PREVACID) 30 MG capsule, Take 30 mg by mouth Daily., Disp: , Rfl:   •  levothyroxine (SYNTHROID, LEVOTHROID) 150 MCG tablet, , Disp: , Rfl:   •  liothyronine (CYTOMEL) 25 MCG tablet, Take 0.5 tablets by mouth Daily., Disp: 30 tablet, Rfl: 1  •  methocarbamol (ROBAXIN) 500 MG tablet, , Disp: , Rfl: 0  •  Norgestim-Eth Estrad Triphasic (ORTHO TRI-CYCLEN, 28, PO), Take  by mouth., Disp: , Rfl:   •  RA VITAMIN D-3 125 MCG (5000 UT) capsule, Take 5,000 Units by mouth Daily., Disp: , Rfl: 0  •  sertraline (ZOLOFT) 25 MG tablet, Take 25 mg by mouth Daily., Disp: , Rfl:       Review of Systems   Constitutional: Positive for  "fatigue and unexpected weight gain.   Eyes: Positive for itching.   Cardiovascular: Positive for palpitations.   Gastrointestinal: Positive for constipation and GERD.   Endocrine: Positive for heat intolerance.   Neurological: Positive for weakness.   Hematological: Bruises/bleeds easily.   Psychiatric/Behavioral: The patient is nervous/anxious.    All other systems reviewed and are negative.      Objective   Blood pressure 122/76, pulse 81, temperature 97.7 °F (36.5 °C), height 157.5 cm (62\"), weight 105 kg (232 lb 6.4 oz), SpO2 98 %.   Physical Exam   Constitutional: She is oriented to person, place, and time.   Cardiovascular: Normal rate, regular rhythm, normal heart sounds and normal pulses.   Abdominal: Normal appearance.   Neurological: She is alert and oriented to person, place, and time.         Results for orders placed or performed during the hospital encounter of 03/22/21   hCG, Quantitative, Pregnancy    Specimen: Blood   Result Value Ref Range    HCG Quantitative <0.50 mIU/mL           Assessment/Plan:    Problem List Items Addressed This Visit        Other    Follicular thyroid cancer (CMS/HCC) - Primary    Relevant Orders    T4, Free    TSH    Thyroglobulin + Thyroglobulin Antibody ()    Postoperative hypothyroidism      Newly diagnosed follicular thyroid carcinoma. 1.8 cm with vascular invasion. 2nd surgery didn't reveal any additional metastatic l/nodes.   Adjuvant I-131 therapy is a good choice of therapy and WBS showed thyroid  uptake only.   I have reviewed WBS images and report, reviewed results and plan of care in details. Thyroglobulin level is 14.9   Administer 100 mci on 3/25.   I have discussed with the patient at length side effects of therapy and precautions.  Radioactive iodine precautions were reviewed with the patient and will need to be followed for 2 weeks after the radioiodine therapy. She will be able to resume normal diet and start synthroid 24 hours after therapy.   I will " repeat thyroid levels on levothyroxine in 2 months.   Follow-up in 2-3  Months

## 2021-03-25 ENCOUNTER — HOSPITAL ENCOUNTER (OUTPATIENT)
Dept: NUCLEAR MEDICINE | Facility: HOSPITAL | Age: 41
Discharge: HOME | End: 2021-03-25

## 2021-03-25 ENCOUNTER — LAB (OUTPATIENT)
Dept: LAB | Facility: HOSPITAL | Age: 41
End: 2021-03-25

## 2021-03-25 DIAGNOSIS — C73 FOLLICULAR THYROID CANCER (HCC): ICD-10-CM

## 2021-03-25 LAB — HCG INTACT+B SERPL-ACNC: <0.5 MIU/ML

## 2021-03-25 PROCEDURE — 79005 NUCLEAR RX ORAL ADMIN: CPT

## 2021-03-25 PROCEDURE — 0 SODIUM IODIDE CAPSULE: Performed by: INTERNAL MEDICINE

## 2021-03-25 PROCEDURE — 84702 CHORIONIC GONADOTROPIN TEST: CPT | Performed by: RADIOLOGY

## 2021-03-25 PROCEDURE — A9517 I131 IODIDE CAP, RX: HCPCS | Performed by: INTERNAL MEDICINE

## 2021-03-25 RX ADMIN — SODIUM IODIDE I 131 1 MILLICURIE: 100 CAPSULE ORAL at 11:01

## 2021-03-29 ENCOUNTER — APPOINTMENT (OUTPATIENT)
Dept: NUCLEAR MEDICINE | Facility: HOSPITAL | Age: 41
End: 2021-03-29

## 2021-04-01 ENCOUNTER — HOSPITAL ENCOUNTER (OUTPATIENT)
Dept: NUCLEAR MEDICINE | Facility: HOSPITAL | Age: 41
Discharge: HOME | End: 2021-04-01

## 2021-04-01 ENCOUNTER — APPOINTMENT (OUTPATIENT)
Dept: NUCLEAR MEDICINE | Facility: HOSPITAL | Age: 41
End: 2021-04-01

## 2021-04-01 DIAGNOSIS — C73 FOLLICULAR THYROID CANCER (HCC): ICD-10-CM

## 2021-04-01 PROCEDURE — 78018 THYROID MET IMAGING BODY: CPT

## 2021-04-06 ENCOUNTER — TELEPHONE (OUTPATIENT)
Dept: ENDOCRINOLOGY | Facility: CLINIC | Age: 41
End: 2021-04-06

## 2021-04-06 NOTE — TELEPHONE ENCOUNTER
Sherita Rivera MD  P [facility]  Please call the patient regarding posttreatment scan results. No additional findings seen outside of the thyroid.

## 2021-06-01 ENCOUNTER — LAB (OUTPATIENT)
Dept: LAB | Facility: HOSPITAL | Age: 41
End: 2021-06-01

## 2021-06-01 ENCOUNTER — LAB (OUTPATIENT)
Dept: ENDOCRINOLOGY | Facility: CLINIC | Age: 41
End: 2021-06-01

## 2021-06-01 DIAGNOSIS — C73 FOLLICULAR THYROID CANCER (HCC): ICD-10-CM

## 2021-06-01 LAB
T4 FREE SERPL-MCNC: 1.78 NG/DL (ref 0.93–1.7)
TSH SERPL DL<=0.05 MIU/L-ACNC: 0.09 UIU/ML (ref 0.27–4.2)

## 2021-06-01 PROCEDURE — 84443 ASSAY THYROID STIM HORMONE: CPT

## 2021-06-01 PROCEDURE — 84439 ASSAY OF FREE THYROXINE: CPT

## 2021-06-03 ENCOUNTER — OFFICE VISIT (OUTPATIENT)
Dept: ENDOCRINOLOGY | Facility: CLINIC | Age: 41
End: 2021-06-03

## 2021-06-03 VITALS
OXYGEN SATURATION: 98 % | HEIGHT: 62 IN | HEART RATE: 70 BPM | DIASTOLIC BLOOD PRESSURE: 68 MMHG | SYSTOLIC BLOOD PRESSURE: 112 MMHG | BODY MASS INDEX: 42.62 KG/M2 | WEIGHT: 231.6 LBS

## 2021-06-03 DIAGNOSIS — C73 FOLLICULAR THYROID CANCER (HCC): Primary | ICD-10-CM

## 2021-06-03 DIAGNOSIS — E89.0 POSTOPERATIVE HYPOTHYROIDISM: ICD-10-CM

## 2021-06-03 PROCEDURE — 99214 OFFICE O/P EST MOD 30 MIN: CPT | Performed by: INTERNAL MEDICINE

## 2021-06-03 RX ORDER — LEVOTHYROXINE SODIUM 137 MCG
137 TABLET ORAL DAILY
Qty: 30 TABLET | Refills: 11 | COMMUNITY
Start: 2021-06-03 | End: 2021-07-06 | Stop reason: SDUPTHER

## 2021-06-03 NOTE — PROGRESS NOTES
"Thyroid Cancer (Follow Up)    Subjective   Roberta Casillas is a 41 y.o. female. 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 Tumor board consensus was adjuvant I-131 therapy, which she received  On 03/25/2005 (100 mCi). TB uptake on the WBS seen.   .    Patient has started levothyroxine 150 mcg after surgery. Recent labs completed. She has gained some weight after surgery and feels fatigued.        Medications:    Current Outpatient Medications:   •  Fexofenadine HCl (ALLEGRA ALLERGY PO), Take  by mouth., Disp: , Rfl:   •  fluticasone (FLONASE) 50 MCG/ACT nasal spray, 2 sprays into each nostril Daily., Disp: , Rfl:   •  lansoprazole (PREVACID) 30 MG capsule, Take 30 mg by mouth Daily., Disp: , Rfl:   •  levothyroxine (SYNTHROID, LEVOTHROID) 150 MCG tablet, , Disp: , Rfl:   •  methocarbamol (ROBAXIN) 500 MG tablet, , Disp: , Rfl: 0  •  Norgestim-Eth Estrad Triphasic (ORTHO TRI-CYCLEN, 28, PO), Take  by mouth., Disp: , Rfl:   •  RA VITAMIN D-3 125 MCG (5000 UT) capsule, Take 5,000 Units by mouth Daily., Disp: , Rfl: 0  •  sertraline (ZOLOFT) 25 MG tablet, Take 25 mg by mouth Daily., Disp: , Rfl:       Review of Systems   Constitutional: Positive for fatigue and unexpected weight gain.   HENT: Positive for congestion.    Eyes: Positive for itching.   Cardiovascular: Positive for palpitations.   Gastrointestinal: Positive for constipation and GERD.   Endocrine: Positive for heat intolerance.   Neurological: Positive for weakness.   Hematological: Bruises/bleeds easily.   Psychiatric/Behavioral: The patient is nervous/anxious.    All other systems reviewed and are negative.      Objective   Blood pressure 112/68, pulse 70, height 157.5 cm (62\"), weight 105 kg (231 lb 9.6 oz), SpO2 98 %.   Physical Exam   Constitutional: She is oriented to person, place, " and time.   Neck:   Postop scar in good condition    Cardiovascular: Normal rate, regular rhythm, normal heart sounds and normal pulses.   Abdominal: Normal appearance.   Lymphadenopathy:     She has no cervical adenopathy.   Neurological: She is alert and oriented to person, place, and time.         Results for orders placed or performed in visit on 06/01/21   T4, Free    Specimen: Blood   Result Value Ref Range    Free T4 1.78 (H) 0.93 - 1.70 ng/dL   TSH    Specimen: Blood   Result Value Ref Range    TSH 0.087 (L) 0.270 - 4.200 uIU/mL           Assessment/Plan:    Problem List Items Addressed This Visit        Other    Follicular thyroid cancer (CMS/HCC) - Primary    Overview     Follicular thyroid carcinoma - 1.8 cm with vascular invasion.   Date TSH Thyroglobulin WBS CT/PET scan ultrasound I-131 therapy Comments   3/25/2021  14.9 TB uptake   100 mCi                                                                                                       Relevant Orders    TSH    T4, Free    Thyroglobulin + Thyroglobulin Antibody ([facility])    Postoperative hypothyroidism      Follicular thyroid carcinoma. 1.8 cm with vascular invasion. S/p I-131 therapy 100 mCi in 3/25/2021, WBS showed thyroid  uptake only.   I have reviewed WBS images and report, reviewed results and plan of care in details. Thyroglobulin level is 14.9 in 1/2021  Administer 100 mci on 3/25. No evidence of extrathyroidal disease on the WBS.     Thyroid function was high on recent labs - reduce the dose of Synthroid to 137 mcg daily. TSH suppression goal reviewed. I have provided samples of Synthroid brand to take for the next 2 months.   Repeat labs in 2 months and obtain TG.     We will plan Thyrogen stimulated WBS to be done in Dec 2021     Follow-up in 4-6  Months

## 2021-07-06 RX ORDER — LEVOTHYROXINE SODIUM 137 MCG
137 TABLET ORAL DAILY
Qty: 30 TABLET | Refills: 5 | COMMUNITY
Start: 2021-07-06 | End: 2021-07-27 | Stop reason: SDUPTHER

## 2021-07-23 ENCOUNTER — TELEPHONE (OUTPATIENT)
Dept: ENDOCRINOLOGY | Facility: CLINIC | Age: 41
End: 2021-07-23

## 2021-07-23 ENCOUNTER — LAB (OUTPATIENT)
Dept: LAB | Facility: HOSPITAL | Age: 41
End: 2021-07-23

## 2021-07-23 ENCOUNTER — LAB (OUTPATIENT)
Dept: ENDOCRINOLOGY | Facility: CLINIC | Age: 41
End: 2021-07-23

## 2021-07-23 DIAGNOSIS — C73 FOLLICULAR THYROID CANCER (HCC): ICD-10-CM

## 2021-07-23 LAB
T4 FREE SERPL-MCNC: 1.87 NG/DL (ref 0.93–1.7)
TSH SERPL DL<=0.05 MIU/L-ACNC: 0.2 UIU/ML (ref 0.27–4.2)

## 2021-07-23 PROCEDURE — 84439 ASSAY OF FREE THYROXINE: CPT

## 2021-07-23 PROCEDURE — 84443 ASSAY THYROID STIM HORMONE: CPT

## 2021-07-23 PROCEDURE — 84432 ASSAY OF THYROGLOBULIN: CPT

## 2021-07-23 PROCEDURE — 36415 COLL VENOUS BLD VENIPUNCTURE: CPT

## 2021-07-23 PROCEDURE — 86800 THYROGLOBULIN ANTIBODY: CPT

## 2021-07-23 NOTE — TELEPHONE ENCOUNTER
PT CAME IN FOR LABS TODAY  SHE WANTED TO LET YOU KNOW THAT SHE LIKES THE BRAND NAME AND HAS ENOUGH TIL Wednesday   PT USES [facility] ON [address]

## 2021-07-27 RX ORDER — LEVOTHYROXINE SODIUM 137 MCG
137 TABLET ORAL DAILY
Qty: 30 TABLET | Refills: 5 | COMMUNITY
Start: 2021-07-27 | End: 2021-07-28

## 2021-07-27 NOTE — TELEPHONE ENCOUNTER
Last OV 6/3/21 Future appt 10/19/21 Will send limited amount in event dosage changed once labs are back

## 2021-07-27 NOTE — TELEPHONE ENCOUNTER
PATIENT IS NEEDING REFILLS SHE ONLY HAS 2 DAYS LEFT OF MEDICATION. SHE NEEDS DR. ALONZO TO REFILL THYROID MEDICATION.

## 2021-07-28 RX ORDER — LEVOTHYROXINE SODIUM 137 MCG
137 TABLET ORAL DAILY
Qty: 30 TABLET | Refills: 3 | Status: SHIPPED | OUTPATIENT
Start: 2021-07-28 | End: 2021-07-31

## 2021-07-28 NOTE — TELEPHONE ENCOUNTER
PT CALLED REQUESTING WE RESEND HER SYNTHROID 137 RX TO BE SENT IN TO [facility] ON [address]. SHE STATED THEY HAVE YET TO RECEIVE THE RX. THANK YOU

## 2021-07-28 NOTE — TELEPHONE ENCOUNTER
E- script did not make it to the pharmacy no receipt confirmed pharmacy.  RX was set on Sample     Synthroid 137 MCG tablet [[id number]]     Order Details  Dose: 137 mcg Route: Oral Frequency: Daily   Dispense Quantity: 30 tablet Refills: 5          Sig: Take 1 tablet by mouth Daily.         Start Date: 07/27/21 End Date: --   Written Date: 07/27/21 Expiration Date: 07/27/22   Original Order:  Synthroid 137 MCG tablet [[id number]]   Providers    Ordering Provider and Authorizing Provider:    Sherita Rivera MD   [address] [address]   Phone:  [phone]   Fax:  [phone]   NPI:  [id number]        Ordering User:  Michelle Walls MA        Pharmacy    [id number] [place], [place] - [address] [place] [address] AT [facility] & [address] - [phone]  - [phone] [address] [address]   Phone:  [phone]  Fax:  [phone]   Orders with any of the following pharmaceutical classes: Thyroid    Name Dose Frequency Start Date End Date Medication Warnings Interventions? Order Mode    Synthroid 137 MCG tablet 137 mcg Daily 07/06/21 07/27/21   Outpatient    Verbal Order Info    Action Created on Order Mode Entered by Responsible Provider Signed by Signed on   Ordering 07/27/21 0856 Per protocol: no cosign required Michelle Walls MA      Warnings History    No Interaction Warnings Shown    Pharmacist Clinical Review History    This prescription has not been clinically reviewed.   Order Reconciliation Actions       Order Reconciliation Actions   E-Prescribing Status    Outpatient Medication Detail    Synthroid 137 MCG tablet        Sig: Take 1 tablet by mouth Daily.        Class: Sample        Route: Oral

## 2021-07-29 LAB — REF LAB TEST METHOD: NORMAL

## 2021-07-31 RX ORDER — LEVOTHYROXINE SODIUM 125 MCG
125 TABLET ORAL DAILY
Qty: 30 TABLET | Refills: 11 | Status: SHIPPED | OUTPATIENT
Start: 2021-07-31 | End: 2021-10-19 | Stop reason: SDUPTHER

## 2021-08-03 ENCOUNTER — TELEPHONE (OUTPATIENT)
Dept: ENDOCRINOLOGY | Facility: CLINIC | Age: 41
End: 2021-08-03

## 2021-08-11 ENCOUNTER — TRANSCRIBE ORDERS (OUTPATIENT)
Dept: ADMINISTRATIVE | Facility: HOSPITAL | Age: 41
End: 2021-08-11

## 2021-08-11 DIAGNOSIS — Z12.31 VISIT FOR SCREENING MAMMOGRAM: Primary | ICD-10-CM

## 2021-08-13 ENCOUNTER — TELEPHONE (OUTPATIENT)
Dept: ENDOCRINOLOGY | Facility: CLINIC | Age: 41
End: 2021-08-13

## 2021-08-13 NOTE — TELEPHONE ENCOUNTER
Response from [facility] was that they do not handle PA for [facility].  Contacted [facility], submitted a verbal over the phone.  Should have results is 24-72 hours/.

## 2021-10-12 ENCOUNTER — APPOINTMENT (OUTPATIENT)
Dept: MAMMOGRAPHY | Facility: HOSPITAL | Age: 41
End: 2021-10-12

## 2021-10-13 ENCOUNTER — TELEPHONE (OUTPATIENT)
Dept: ENDOCRINOLOGY | Facility: CLINIC | Age: 41
End: 2021-10-13

## 2021-10-13 ENCOUNTER — HOSPITAL ENCOUNTER (OUTPATIENT)
Dept: MAMMOGRAPHY | Facility: HOSPITAL | Age: 41
Discharge: HOME | End: 2021-10-13
Admitting: OBSTETRICS & GYNECOLOGY

## 2021-10-13 DIAGNOSIS — Z12.31 VISIT FOR SCREENING MAMMOGRAM: ICD-10-CM

## 2021-10-13 DIAGNOSIS — C73 FOLLICULAR THYROID CANCER (HCC): Primary | ICD-10-CM

## 2021-10-13 DIAGNOSIS — E89.0 POSTOPERATIVE HYPOTHYROIDISM: ICD-10-CM

## 2021-10-13 PROCEDURE — 77067 SCR MAMMO BI INCL CAD: CPT

## 2021-10-13 PROCEDURE — 77063 BREAST TOMOSYNTHESIS BI: CPT

## 2021-10-13 PROCEDURE — 77063 BREAST TOMOSYNTHESIS BI: CPT | Performed by: RADIOLOGY

## 2021-10-13 PROCEDURE — 77067 SCR MAMMO BI INCL CAD: CPT | Performed by: RADIOLOGY

## 2021-10-13 NOTE — TELEPHONE ENCOUNTER
PATIENT WOULD LIKE TO KNOW IF SHE NEEDS TO HAVE LABS DONE PRIOR TO HER UPCOMING APPT ON 10/19/2021. NO CURRENT ORDERS IN CHART. PATIENT STATES THAT DR ALONZO USUALLY WANTS HER TO HAVE LABS DRAWN PRIOR TO HER APPT BUT STATES THAT SHE CAN'T REMEMBER IF THIS WAS DISCUSSED AT HER LAST APPT IN June.     CALL BACK [phone]

## 2021-10-13 NOTE — TELEPHONE ENCOUNTER
Please advise if you want: Thyroglobulin + Thyroglobulin Antibody ([place])  I Can place orders for the others

## 2021-10-15 ENCOUNTER — LAB (OUTPATIENT)
Dept: LAB | Facility: HOSPITAL | Age: 41
End: 2021-10-15

## 2021-10-15 ENCOUNTER — LAB (OUTPATIENT)
Dept: ENDOCRINOLOGY | Facility: CLINIC | Age: 41
End: 2021-10-15

## 2021-10-15 DIAGNOSIS — C73 FOLLICULAR THYROID CANCER (HCC): ICD-10-CM

## 2021-10-15 DIAGNOSIS — E89.0 POSTOPERATIVE HYPOTHYROIDISM: ICD-10-CM

## 2021-10-15 LAB
ALBUMIN SERPL-MCNC: 4.3 G/DL (ref 3.5–5.2)
ALBUMIN/GLOB SERPL: 1.6 G/DL
ALP SERPL-CCNC: 71 U/L (ref 39–117)
ALT SERPL W P-5'-P-CCNC: 19 U/L (ref 1–33)
ANION GAP SERPL CALCULATED.3IONS-SCNC: 9.3 MMOL/L (ref 5–15)
AST SERPL-CCNC: 19 U/L (ref 1–32)
BILIRUB SERPL-MCNC: <0.2 MG/DL (ref 0–1.2)
BUN SERPL-MCNC: 10 MG/DL (ref 6–20)
BUN/CREAT SERPL: 15.6 (ref 7–25)
CALCIUM SPEC-SCNC: 9.3 MG/DL (ref 8.6–10.5)
CHLORIDE SERPL-SCNC: 102 MMOL/L (ref 98–107)
CO2 SERPL-SCNC: 25.7 MMOL/L (ref 22–29)
CREAT SERPL-MCNC: 0.64 MG/DL (ref 0.57–1)
GFR SERPL CREATININE-BSD FRML MDRD: 103 ML/MIN/1.73
GLOBULIN UR ELPH-MCNC: 2.7 GM/DL
GLUCOSE SERPL-MCNC: 90 MG/DL (ref 65–99)
POTASSIUM SERPL-SCNC: 4 MMOL/L (ref 3.5–5.2)
PROT SERPL-MCNC: 7 G/DL (ref 6–8.5)
SODIUM SERPL-SCNC: 137 MMOL/L (ref 136–145)

## 2021-10-15 PROCEDURE — 80053 COMPREHEN METABOLIC PANEL: CPT

## 2021-10-15 PROCEDURE — 36415 COLL VENOUS BLD VENIPUNCTURE: CPT

## 2021-10-15 PROCEDURE — 84439 ASSAY OF FREE THYROXINE: CPT

## 2021-10-15 PROCEDURE — 84443 ASSAY THYROID STIM HORMONE: CPT

## 2021-10-16 LAB
T4 FREE SERPL-MCNC: 1.66 NG/DL (ref 0.93–1.7)
TSH SERPL DL<=0.05 MIU/L-ACNC: 0.15 UIU/ML (ref 0.27–4.2)

## 2021-10-19 ENCOUNTER — OFFICE VISIT (OUTPATIENT)
Dept: ENDOCRINOLOGY | Facility: CLINIC | Age: 41
End: 2021-10-19

## 2021-10-19 VITALS
DIASTOLIC BLOOD PRESSURE: 76 MMHG | SYSTOLIC BLOOD PRESSURE: 120 MMHG | OXYGEN SATURATION: 97 % | WEIGHT: 230.5 LBS | BODY MASS INDEX: 42.42 KG/M2 | HEIGHT: 62 IN | HEART RATE: 107 BPM

## 2021-10-19 DIAGNOSIS — E89.0 POSTOPERATIVE HYPOTHYROIDISM: ICD-10-CM

## 2021-10-19 DIAGNOSIS — C73 FOLLICULAR THYROID CANCER (HCC): Primary | ICD-10-CM

## 2021-10-19 PROCEDURE — 99214 OFFICE O/P EST MOD 30 MIN: CPT | Performed by: INTERNAL MEDICINE

## 2021-10-19 RX ORDER — LEVOTHYROXINE SODIUM 125 MCG
125 TABLET ORAL DAILY
Qty: 90 TABLET | Refills: 3 | Status: SHIPPED | OUTPATIENT
Start: 2021-10-19 | End: 2022-08-17 | Stop reason: SDUPTHER

## 2021-10-19 RX ORDER — LEVOCETIRIZINE DIHYDROCHLORIDE 5 MG/1
5 TABLET, FILM COATED ORAL DAILY
COMMUNITY
Start: 2021-07-31

## 2021-10-19 NOTE — PROGRESS NOTES
"Thyroid Cancer (Follow UP)    Subjective   Roberta Casillas is a 41 y.o. female. 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 Patient is s/p adjuvant I-131 therapy, which she received  On 03/25/2021 (100 mCi). TB uptake on the WBS seen.      Patient is taking levothyroxine 125 mcg. Recent labs completed.     Medications:    Current Outpatient Medications:   •  fluticasone (FLONASE) 50 MCG/ACT nasal spray, 2 sprays into each nostril Daily., Disp: , Rfl:   •  lansoprazole (PREVACID) 30 MG capsule, Take 30 mg by mouth Daily., Disp: , Rfl:   •  methocarbamol (ROBAXIN) 500 MG tablet, , Disp: , Rfl: 0  •  Norgestim-Eth Estrad Triphasic (ORTHO TRI-CYCLEN, 28, PO), Take  by mouth., Disp: , Rfl:   •  RA VITAMIN D-3 125 MCG (5000 UT) capsule, Take 5,000 Units by mouth Daily., Disp: , Rfl: 0  •  sertraline (ZOLOFT) 25 MG tablet, Take 25 mg by mouth Daily., Disp: , Rfl:   •  Synthroid 125 MCG tablet, Take 1 tablet by mouth Daily., Disp: 90 tablet, Rfl: 3  •  levocetirizine (XYZAL) 5 MG tablet, Take 5 mg by mouth Daily., Disp: , Rfl:     Current Facility-Administered Medications:   •  thyrotropin andres (THYROGEN) IM solution 0.9 mg, 0.9 mg, Intramuscular, Q24H, Sherita Rivera MD      Review of Systems   Constitutional: Positive for fatigue and unexpected weight gain.   HENT: Positive for congestion.    Eyes: Positive for itching.   Cardiovascular: Positive for palpitations.   Gastrointestinal: Positive for constipation and GERD.   Endocrine: Positive for heat intolerance.   Neurological: Positive for weakness.   Hematological: Bruises/bleeds easily.   Psychiatric/Behavioral: The patient is nervous/anxious.    All other systems reviewed and are negative.      Objective   Blood pressure 120/76, pulse 107, height 157.5 cm (62\"), weight 105 kg (230 lb 8 oz), last " menstrual period 10/06/2021, SpO2 97 %. Body mass index is 42.16 kg/m².  Physical Exam   Constitutional: She is oriented to person, place, and time.   Neck:   Postop scar in good condition    Cardiovascular: Normal rate, regular rhythm, normal heart sounds and normal pulses.   Abdominal: Normal appearance.   Lymphadenopathy:     She has no cervical adenopathy.   Neurological: She is alert and oriented to person, place, and time.         Results for orders placed or performed in visit on 10/15/21   TSH    Specimen: Blood   Result Value Ref Range    TSH 0.147 (L) 0.270 - 4.200 uIU/mL   T4, Free    Specimen: Blood   Result Value Ref Range    Free T4 1.66 0.93 - 1.70 ng/dL   Comprehensive Metabolic Panel    Specimen: Blood   Result Value Ref Range    Glucose 90 65 - 99 mg/dL    BUN 10 6 - 20 mg/dL    Creatinine 0.64 0.57 - 1.00 mg/dL    Sodium 137 136 - 145 mmol/L    Potassium 4.0 3.5 - 5.2 mmol/L    Chloride 102 98 - 107 mmol/L    CO2 25.7 22.0 - 29.0 mmol/L    Calcium 9.3 8.6 - 10.5 mg/dL    Total Protein 7.0 6.0 - 8.5 g/dL    Albumin 4.30 3.50 - 5.20 g/dL    ALT (SGPT) 19 1 - 33 U/L    AST (SGOT) 19 1 - 32 U/L    Alkaline Phosphatase 71 39 - 117 U/L    Total Bilirubin <0.2 0.0 - 1.2 mg/dL    eGFR Non African Amer 103 >60 mL/min/1.73    Globulin 2.7 gm/dL    A/G Ratio 1.6 g/dL    BUN/Creatinine Ratio 15.6 7.0 - 25.0    Anion Gap 9.3 5.0 - 15.0 mmol/L           Assessment/Plan:    Problem List Items Addressed This Visit        Other    Follicular thyroid cancer (HCC) - Primary    Overview     Follicular thyroid carcinoma - 1.8 cm with vascular invasion.   Date TSH Thyroglobulin WBS CT/PET scan ultrasound I-131 therapy Comments   3/25/2021  14.9 TB uptake   100 mCi    7/22/2021 0.2, high fT4 0.1     137-->125 mcg                                                                                             Relevant Medications    Synthroid 125 MCG tablet    thyrotropin andres (THYROGEN) IM solution 0.9 mg    Other Relevant  Orders    TSH    Thyroglobulin + Thyroglobulin Antibody ()    NM Thyroid Cancer Metastatic Scan Whole Body    Pregnancy, Urine - Urine, Clean Catch    Postoperative hypothyroidism    Relevant Medications    Synthroid 125 MCG tablet      Follicular thyroid carcinoma. 1.8 cm with vascular invasion. S/p I-131 therapy 100 mCi in 3/25/2021, WBS showed thyroid  uptake only.   I have reviewed WBS images and report, reviewed results and plan of care in details. Thyroglobulin level is 0.1 in 1/2021  100 mci on 3/25. No evidence of extrathyroidal disease on the WBS.     Thyroid function was high on recent labs - reduced the dose of Synthroid to 125 mcg daily. TFT are at goal of therapy. We have changed to Synthroid brand medication.     We will plan Thyrogen stimulated WBS to be done in Dec 2021. I have asked her to start low iodine diet 2 weeks before the test and hold Synthroid for a week    Follow-up in 4-6  Months

## 2021-11-15 ENCOUNTER — HOSPITAL ENCOUNTER (OUTPATIENT)
Dept: MAMMOGRAPHY | Facility: HOSPITAL | Age: 41
Discharge: HOME | End: 2021-11-15

## 2021-11-15 ENCOUNTER — HOSPITAL ENCOUNTER (OUTPATIENT)
Dept: ULTRASOUND IMAGING | Facility: HOSPITAL | Age: 41
Discharge: HOME | End: 2021-11-15

## 2021-11-15 ENCOUNTER — TRANSCRIBE ORDERS (OUTPATIENT)
Dept: MAMMOGRAPHY | Facility: HOSPITAL | Age: 41
End: 2021-11-15

## 2021-11-15 DIAGNOSIS — R92.8 ABNORMAL MAMMOGRAM: ICD-10-CM

## 2021-11-15 DIAGNOSIS — R92.8 ABNORMAL MAMMOGRAM: Primary | ICD-10-CM

## 2021-11-15 PROCEDURE — G0279 TOMOSYNTHESIS, MAMMO: HCPCS

## 2021-11-15 PROCEDURE — 77066 DX MAMMO INCL CAD BI: CPT

## 2021-11-15 PROCEDURE — 76642 ULTRASOUND BREAST LIMITED: CPT | Performed by: RADIOLOGY

## 2021-11-15 PROCEDURE — 76642 ULTRASOUND BREAST LIMITED: CPT

## 2021-11-15 PROCEDURE — 77066 DX MAMMO INCL CAD BI: CPT | Performed by: RADIOLOGY

## 2021-11-15 PROCEDURE — 77062 BREAST TOMOSYNTHESIS BI: CPT | Performed by: RADIOLOGY

## 2021-12-06 ENCOUNTER — HOSPITAL ENCOUNTER (OUTPATIENT)
Dept: MAMMOGRAPHY | Facility: HOSPITAL | Age: 41
Discharge: HOME | End: 2021-12-06

## 2021-12-06 ENCOUNTER — HOSPITAL ENCOUNTER (OUTPATIENT)
Dept: ULTRASOUND IMAGING | Facility: HOSPITAL | Age: 41
Discharge: HOME | End: 2021-12-06

## 2021-12-06 DIAGNOSIS — R92.8 ABNORMAL MAMMOGRAM: ICD-10-CM

## 2021-12-06 PROCEDURE — A4648 IMPLANTABLE TISSUE MARKER: HCPCS

## 2021-12-06 PROCEDURE — 25010000002 CHLOROPROCAINE HCL (PF) 3 % SOLUTION: Performed by: RADIOLOGY

## 2021-12-06 PROCEDURE — 88305 TISSUE EXAM BY PATHOLOGIST: CPT | Performed by: OBSTETRICS & GYNECOLOGY

## 2021-12-06 PROCEDURE — 19082 BX BREAST ADD LESION STRTCTC: CPT | Performed by: RADIOLOGY

## 2021-12-06 PROCEDURE — 19083 BX BREAST 1ST LESION US IMAG: CPT | Performed by: RADIOLOGY

## 2021-12-06 PROCEDURE — 77066 DX MAMMO INCL CAD BI: CPT | Performed by: RADIOLOGY

## 2021-12-06 PROCEDURE — 19081 BX BREAST 1ST LESION STRTCTC: CPT | Performed by: RADIOLOGY

## 2021-12-06 RX ORDER — CHLOROPROCAINE HYDROCHLORIDE 30 MG/ML
5 INJECTION, SOLUTION EPIDURAL; INFILTRATION; INTRACAUDAL; PERINEURAL ONCE
Status: COMPLETED | OUTPATIENT
Start: 2021-12-06 | End: 2021-12-06

## 2021-12-06 RX ORDER — CHLOROPROCAINE HYDROCHLORIDE 30 MG/ML
5 INJECTION, SOLUTION EPIDURAL; INFILTRATION; INTRACAUDAL; PERINEURAL ONCE
Status: DISCONTINUED | OUTPATIENT
Start: 2021-12-06 | End: 2021-12-07 | Stop reason: HOSPADM

## 2021-12-06 RX ADMIN — CHLOROPROCAINE HYDROCHLORIDE 5 ML: 30 INJECTION, SOLUTION EPIDURAL; INFILTRATION; INTRACAUDAL; PERINEURAL at 14:16

## 2021-12-06 RX ADMIN — CHLOROPROCAINE HYDROCHLORIDE 2 ML: 30 INJECTION, SOLUTION EPIDURAL; INFILTRATION; INTRACAUDAL; PERINEURAL at 13:49

## 2021-12-06 NOTE — PROGRESS NOTES
Alert and orientated. Appears in no acute distress. No active bleeding. Steri-strips not visualized, gauze dressings intact x 3 sites. Ice packs given. Verbalizes and demonstrates understanding of post-care instructions, written copy given.

## 2021-12-08 LAB
CYTO UR: NORMAL
LAB AP CASE REPORT: NORMAL
LAB AP CLINICAL INFORMATION: NORMAL
LAB AP DIAGNOSIS COMMENT: NORMAL
PATH REPORT.FINAL DX SPEC: NORMAL
PATH REPORT.GROSS SPEC: NORMAL

## 2021-12-09 ENCOUNTER — TELEPHONE (OUTPATIENT)
Dept: MAMMOGRAPHY | Facility: HOSPITAL | Age: 41
End: 2021-12-09

## 2021-12-09 RX ORDER — WATER 1000 ML/1000ML
INJECTION, SOLUTION INTRAVENOUS
Qty: 20 ML | Refills: 6 | Status: SHIPPED | OUTPATIENT
Start: 2021-12-09 | End: 2021-12-17

## 2021-12-09 NOTE — TELEPHONE ENCOUNTER
Returned pt call. States she was notified of her pathology results on My Chart pt portal and wanting to discuss the results. Denies discomfort. Denies signs and symptoms of infection. Explained pathology results and encouraged pt to see handout she received at post procedure visit. Verbalizes understanding. Pt notified the radiologist will review the results and may discuss findings with the pathologist, other radiologists &/or referring provider before making a final decision about a recommendation. Pt notified when a final decision is made and communicated to nurses, the pt would be contacted. Questions answered, support given. Verbalized understanding.

## 2021-12-10 ENCOUNTER — TELEPHONE (OUTPATIENT)
Dept: MAMMOGRAPHY | Facility: HOSPITAL | Age: 41
End: 2021-12-10

## 2021-12-10 NOTE — TELEPHONE ENCOUNTER
Pt notified Dr RAJESH Spring's follow-up recommendation now available.  Verbalized understanding.    Pt notified of surgical consult appointment with previously recommended surgeon, Dr AXEL Skelton on 12/16/21 @ [id number] . Pt given office contact & location information. Told to bring photo ID, list of prescription & OTC medications, insurance information, must wear a mask. Encouraged pt to call back or contact surgeon's office with further questions. Pt verbalized understanding.

## 2021-12-13 ENCOUNTER — CLINICAL SUPPORT (OUTPATIENT)
Dept: ENDOCRINOLOGY | Facility: CLINIC | Age: 41
End: 2021-12-13

## 2021-12-13 DIAGNOSIS — C73 FOLLICULAR THYROID CANCER (HCC): Primary | ICD-10-CM

## 2021-12-14 ENCOUNTER — CLINICAL SUPPORT (OUTPATIENT)
Dept: ENDOCRINOLOGY | Facility: CLINIC | Age: 41
End: 2021-12-14

## 2021-12-14 DIAGNOSIS — C73 FOLLICULAR THYROID CANCER (HCC): Primary | ICD-10-CM

## 2021-12-15 ENCOUNTER — HOSPITAL ENCOUNTER (OUTPATIENT)
Dept: NUCLEAR MEDICINE | Facility: HOSPITAL | Age: 41
Discharge: HOME | End: 2021-12-15

## 2021-12-15 ENCOUNTER — LAB (OUTPATIENT)
Dept: LAB | Facility: HOSPITAL | Age: 41
End: 2021-12-15

## 2021-12-15 DIAGNOSIS — C73 FOLLICULAR THYROID CANCER (HCC): ICD-10-CM

## 2021-12-15 LAB
B-HCG UR QL: NEGATIVE
HCG INTACT+B SERPL-ACNC: 0.18 MIU/ML
TSH SERPL DL<=0.05 MIU/L-ACNC: 169 UIU/ML (ref 0.27–4.2)

## 2021-12-15 PROCEDURE — 84702 CHORIONIC GONADOTROPIN TEST: CPT | Performed by: RADIOLOGY

## 2021-12-15 PROCEDURE — 84443 ASSAY THYROID STIM HORMONE: CPT

## 2021-12-15 PROCEDURE — 84432 ASSAY OF THYROGLOBULIN: CPT | Performed by: INTERNAL MEDICINE

## 2021-12-15 PROCEDURE — 86800 THYROGLOBULIN ANTIBODY: CPT | Performed by: INTERNAL MEDICINE

## 2021-12-15 PROCEDURE — 78018 THYROID MET IMAGING BODY: CPT

## 2021-12-15 PROCEDURE — 0 SODIUM IODIDE CAPSULE: Performed by: INTERNAL MEDICINE

## 2021-12-15 PROCEDURE — A9528 IODINE I-131 IODIDE CAP, DX: HCPCS | Performed by: INTERNAL MEDICINE

## 2021-12-15 PROCEDURE — 81025 URINE PREGNANCY TEST: CPT

## 2021-12-15 RX ADMIN — SODIUM IODIDE I 131 1 CAPSULE: 100 CAPSULE ORAL at 15:30

## 2021-12-16 ENCOUNTER — TRANSCRIBE ORDERS (OUTPATIENT)
Dept: MAMMOGRAPHY | Facility: HOSPITAL | Age: 41
End: 2021-12-16

## 2021-12-16 DIAGNOSIS — R92.8 ABNORMAL MAMMOGRAM: Primary | ICD-10-CM

## 2021-12-17 ENCOUNTER — HOSPITAL ENCOUNTER (OUTPATIENT)
Dept: NUCLEAR MEDICINE | Facility: HOSPITAL | Age: 41
Discharge: HOME | End: 2021-12-17

## 2021-12-17 ENCOUNTER — OFFICE VISIT (OUTPATIENT)
Dept: ENDOCRINOLOGY | Facility: CLINIC | Age: 41
End: 2021-12-17

## 2021-12-17 ENCOUNTER — TRANSCRIBE ORDERS (OUTPATIENT)
Dept: ADMINISTRATIVE | Facility: HOSPITAL | Age: 41
End: 2021-12-17

## 2021-12-17 VITALS
WEIGHT: 231 LBS | HEIGHT: 62 IN | BODY MASS INDEX: 42.51 KG/M2 | OXYGEN SATURATION: 98 % | SYSTOLIC BLOOD PRESSURE: 116 MMHG | DIASTOLIC BLOOD PRESSURE: 70 MMHG | HEART RATE: 86 BPM

## 2021-12-17 DIAGNOSIS — E89.0 POSTOPERATIVE HYPOTHYROIDISM: Primary | ICD-10-CM

## 2021-12-17 DIAGNOSIS — Z11.59 ENCOUNTER FOR SCREENING FOR VIRAL DISEASE: Primary | ICD-10-CM

## 2021-12-17 DIAGNOSIS — C73 FOLLICULAR THYROID CANCER (HCC): ICD-10-CM

## 2021-12-17 PROCEDURE — 99214 OFFICE O/P EST MOD 30 MIN: CPT | Performed by: INTERNAL MEDICINE

## 2021-12-17 RX ORDER — LIOTHYRONINE SODIUM 25 UG/1
25 TABLET ORAL DAILY
Qty: 30 TABLET | Refills: 0 | Status: SHIPPED | OUTPATIENT
Start: 2021-12-17 | End: 2023-03-15

## 2021-12-17 NOTE — PROGRESS NOTES
Hypothyroidism (Follow Up)    Subjective   Roberta Casillas is a 41 y.o. female. 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 Patient is s/p adjuvant I-131 therapy, which she received on 03/25/2021 (100 mCi). TB uptake on the WBS was seen.   12/17/2021 patient underwent WBS with hypothyroid preparation and presented for review of results.     Patient reported fatigue related to hypothyroid prep.       Medications:    Current Outpatient Medications:   •  fluticasone (FLONASE) 50 MCG/ACT nasal spray, 2 sprays into each nostril Daily., Disp: , Rfl:   •  lansoprazole (PREVACID) 30 MG capsule, Take 30 mg by mouth Daily., Disp: , Rfl:   •  levocetirizine (XYZAL) 5 MG tablet, Take 5 mg by mouth Daily., Disp: , Rfl:   •  methocarbamol (ROBAXIN) 500 MG tablet, , Disp: , Rfl: 0  •  Norgestim-Eth Estrad Triphasic (ORTHO TRI-CYCLEN, 28, PO), Take  by mouth., Disp: , Rfl:   •  RA VITAMIN D-3 125 MCG (5000 UT) capsule, Take 5,000 Units by mouth Daily., Disp: , Rfl: 0  •  sertraline (ZOLOFT) 25 MG tablet, Take 25 mg by mouth Daily., Disp: , Rfl:   •  Synthroid 125 MCG tablet, Take 1 tablet by mouth Daily., Disp: 90 tablet, Rfl: 3  •  liothyronine (CYTOMEL) 25 MCG tablet, Take 1 tablet by mouth Daily. Take 1 tab twice a day for 2 days, then 1/2 tab twice a day for 3 days, then 1/2 tab once a day for another 3 days., Disp: 30 tablet, Rfl: 0    Current Facility-Administered Medications:   •  thyrotropin andres (THYROGEN) IM solution 0.9 mg, 0.9 mg, Intramuscular, Q24H, Sherita Rivera MD, 0.9 mg at 12/13/21 1550  •  thyrotropin andres (THYROGEN) IM solution 0.9 mg, 0.9 mg, Intramuscular, Q24H, Sherita Rivera MD, 0.9 mg at 12/14/21 1200      Review of Systems   Constitutional: Positive for fatigue and unexpected weight gain.   HENT: Positive for congestion.    Eyes:  "Positive for itching.   Cardiovascular: Positive for palpitations.   Gastrointestinal: Positive for constipation and GERD.   Endocrine: Positive for heat intolerance.   Neurological: Positive for weakness.   Hematological: Bruises/bleeds easily.   Psychiatric/Behavioral: The patient is nervous/anxious.    All other systems reviewed and are negative.      Objective   Blood pressure 116/70, pulse 86, height 157.5 cm (62\"), weight 105 kg (231 lb), SpO2 98 %. Body mass index is 42.25 kg/m².  Physical Exam   Constitutional: She is oriented to person, place, and time.   Neck:   Postop scar in good condition    Cardiovascular: Normal rate, regular rhythm, normal heart sounds and normal pulses.   Abdominal: Normal appearance.   Lymphadenopathy:     She has no cervical adenopathy.   Neurological: She is alert and oriented to person, place, and time.         Results for orders placed or performed during the hospital encounter of 12/15/21   hCG, Quantitative, Pregnancy    Specimen: Blood   Result Value Ref Range    HCG Quantitative 0.18 mIU/mL           Assessment/Plan:    Problem List Items Addressed This Visit        Other    Follicular thyroid cancer (HCC)    Overview     Follicular thyroid carcinoma - 1.8 cm with vascular invasion.   Date TSH Thyroglobulin WBS CT/PET scan ultrasound I-131 therapy Comments   3/25/2021  14.9 TB uptake   100 mCi    7/22/2021 0.2, high fT4 0.1     137-->125 mcg                                                                                             Relevant Medications    liothyronine (CYTOMEL) 25 MCG tablet    Postoperative hypothyroidism - Primary    Relevant Medications    liothyronine (CYTOMEL) 25 MCG tablet    Other Relevant Orders    T4, Free    TSH      Follicular thyroid carcinoma. 1.8 cm with vascular invasion. S/p I-131 therapy 100 mCi in 3/25/2021, WBS showed thyroid  uptake only.   She had repeat WBS earlier today. I have reviewed WBS images and report, called radiologist and " discussed results - negative scan.   Stimulated Tg level is 0.1, favorable results. No evidence of recurrence.   Results reviewed with the patient. Restart medication and normal diet. I have given her instructions to take synthroid and cytomel for faster clinical response and elimination of the sx of hypothyroidism.      Synthroid  125 mcg daily. Recheck labs in 2-3 months  Follow-up in 4-6  Months

## 2022-01-09 ENCOUNTER — LAB (OUTPATIENT)
Dept: PREADMISSION TESTING | Facility: HOSPITAL | Age: 42
End: 2022-01-09

## 2022-01-09 DIAGNOSIS — Z11.59 ENCOUNTER FOR SCREENING FOR VIRAL DISEASE: ICD-10-CM

## 2022-01-09 LAB — SARS-COV-2 RNA PNL SPEC NAA+PROBE: NOT DETECTED

## 2022-01-09 PROCEDURE — U0004 COV-19 TEST NON-CDC HGH THRU: HCPCS | Performed by: SURGERY

## 2022-01-12 ENCOUNTER — HOSPITAL ENCOUNTER (OUTPATIENT)
Dept: MAMMOGRAPHY | Facility: HOSPITAL | Age: 42
Discharge: HOME | End: 2022-01-12

## 2022-01-12 ENCOUNTER — HOSPITAL ENCOUNTER (OUTPATIENT)
Dept: ULTRASOUND IMAGING | Facility: HOSPITAL | Age: 42
Discharge: HOME | End: 2022-01-12

## 2022-01-12 ENCOUNTER — LAB REQUISITION (OUTPATIENT)
Dept: LAB | Facility: HOSPITAL | Age: 42
End: 2022-01-12

## 2022-01-12 DIAGNOSIS — R92.8 ABNORMAL MAMMOGRAM: ICD-10-CM

## 2022-01-12 DIAGNOSIS — R92.8 OTHER ABNORMAL AND INCONCLUSIVE FINDINGS ON DIAGNOSTIC IMAGING OF BREAST: ICD-10-CM

## 2022-01-12 PROCEDURE — 76098 X-RAY EXAM SURGICAL SPECIMEN: CPT

## 2022-01-12 PROCEDURE — 19281 PERQ DEVICE BREAST 1ST IMAG: CPT | Performed by: RADIOLOGY

## 2022-01-12 PROCEDURE — 76098 X-RAY EXAM SURGICAL SPECIMEN: CPT | Performed by: RADIOLOGY

## 2022-01-12 PROCEDURE — C1819 TISSUE LOCALIZATION-EXCISION: HCPCS

## 2022-01-12 PROCEDURE — 25010000002 CHLOROPROCAINE HCL (PF) 3 % SOLUTION: Performed by: RADIOLOGY

## 2022-01-12 PROCEDURE — 88307 TISSUE EXAM BY PATHOLOGIST: CPT | Performed by: SURGERY

## 2022-01-12 PROCEDURE — 77065 DX MAMMO INCL CAD UNI: CPT | Performed by: RADIOLOGY

## 2022-01-12 PROCEDURE — 19285 PERQ DEV BREAST 1ST US IMAG: CPT | Performed by: RADIOLOGY

## 2022-01-12 PROCEDURE — 19282 PERQ DEVICE BREAST EA IMAG: CPT | Performed by: RADIOLOGY

## 2022-01-12 RX ORDER — CHLOROPROCAINE HYDROCHLORIDE 30 MG/ML
15 INJECTION, SOLUTION EPIDURAL; INFILTRATION; INTRACAUDAL; PERINEURAL ONCE
Status: COMPLETED | OUTPATIENT
Start: 2022-01-12 | End: 2022-01-12

## 2022-01-12 RX ADMIN — CHLOROPROCAINE HYDROCHLORIDE 10 ML: 30 INJECTION, SOLUTION EPIDURAL; INFILTRATION; INTRACAUDAL; PERINEURAL at 09:07

## 2022-01-12 RX ADMIN — CHLOROPROCAINE HYDROCHLORIDE 2 ML: 30 INJECTION, SOLUTION EPIDURAL; INFILTRATION; INTRACAUDAL; PERINEURAL at 09:18

## 2022-01-13 LAB
CYTO UR: NORMAL
LAB AP CASE REPORT: NORMAL
LAB AP CLINICAL INFORMATION: NORMAL
PATH REPORT.FINAL DX SPEC: NORMAL
PATH REPORT.GROSS SPEC: NORMAL

## 2022-03-16 ENCOUNTER — LAB (OUTPATIENT)
Dept: ENDOCRINOLOGY | Facility: CLINIC | Age: 42
End: 2022-03-16

## 2022-03-16 ENCOUNTER — LAB (OUTPATIENT)
Dept: LAB | Facility: HOSPITAL | Age: 42
End: 2022-03-16

## 2022-03-16 DIAGNOSIS — E89.0 POSTOPERATIVE HYPOTHYROIDISM: ICD-10-CM

## 2022-03-16 PROCEDURE — 84439 ASSAY OF FREE THYROXINE: CPT

## 2022-03-16 PROCEDURE — 84443 ASSAY THYROID STIM HORMONE: CPT

## 2022-03-17 LAB
T4 FREE SERPL-MCNC: 1.7 NG/DL (ref 0.93–1.7)
TSH SERPL DL<=0.05 MIU/L-ACNC: 2.49 UIU/ML (ref 0.27–4.2)

## 2022-03-23 ENCOUNTER — TELEPHONE (OUTPATIENT)
Dept: ENDOCRINOLOGY | Facility: CLINIC | Age: 42
End: 2022-03-23

## 2022-03-25 NOTE — TELEPHONE ENCOUNTER
Thyroid level is normal, please continue the same dose medication   Written by Sherita WHITE MD on 3/24/2022 11:15 PM EDT  Seen by patient Roberta Casillas on 3/25/2022  4:05 AM

## 2022-05-16 ENCOUNTER — TRANSCRIBE ORDERS (OUTPATIENT)
Dept: ADMINISTRATIVE | Facility: HOSPITAL | Age: 42
End: 2022-05-16

## 2022-05-16 DIAGNOSIS — R92.8 ABNORMAL MAMMOGRAM: Primary | ICD-10-CM

## 2022-05-31 ENCOUNTER — HOSPITAL ENCOUNTER (OUTPATIENT)
Dept: MAMMOGRAPHY | Facility: HOSPITAL | Age: 42
Discharge: HOME | End: 2022-05-31
Admitting: RADIOLOGY

## 2022-05-31 ENCOUNTER — TRANSCRIBE ORDERS (OUTPATIENT)
Dept: MAMMOGRAPHY | Facility: HOSPITAL | Age: 42
End: 2022-05-31

## 2022-05-31 DIAGNOSIS — R92.8 ABNORMAL MAMMOGRAM: ICD-10-CM

## 2022-05-31 DIAGNOSIS — R92.8 ABNORMAL MAMMOGRAM: Primary | ICD-10-CM

## 2022-05-31 PROCEDURE — 77062 BREAST TOMOSYNTHESIS BI: CPT | Performed by: RADIOLOGY

## 2022-05-31 PROCEDURE — G0279 TOMOSYNTHESIS, MAMMO: HCPCS

## 2022-05-31 PROCEDURE — 77066 DX MAMMO INCL CAD BI: CPT

## 2022-05-31 PROCEDURE — 77066 DX MAMMO INCL CAD BI: CPT | Performed by: RADIOLOGY

## 2022-08-12 ENCOUNTER — LAB (OUTPATIENT)
Dept: ENDOCRINOLOGY | Facility: CLINIC | Age: 42
End: 2022-08-12

## 2022-08-12 ENCOUNTER — LAB (OUTPATIENT)
Dept: LAB | Facility: HOSPITAL | Age: 42
End: 2022-08-12

## 2022-08-12 DIAGNOSIS — C73 FOLLICULAR THYROID CANCER: Primary | ICD-10-CM

## 2022-08-12 LAB
T4 FREE SERPL-MCNC: 1.56 NG/DL (ref 0.93–1.7)
TSH SERPL DL<=0.05 MIU/L-ACNC: 0.81 UIU/ML (ref 0.27–4.2)

## 2022-08-12 PROCEDURE — 86800 THYROGLOBULIN ANTIBODY: CPT | Performed by: INTERNAL MEDICINE

## 2022-08-12 PROCEDURE — 84443 ASSAY THYROID STIM HORMONE: CPT | Performed by: INTERNAL MEDICINE

## 2022-08-12 PROCEDURE — 84432 ASSAY OF THYROGLOBULIN: CPT | Performed by: INTERNAL MEDICINE

## 2022-08-12 PROCEDURE — 36415 COLL VENOUS BLD VENIPUNCTURE: CPT | Performed by: INTERNAL MEDICINE

## 2022-08-12 PROCEDURE — 84439 ASSAY OF FREE THYROXINE: CPT | Performed by: INTERNAL MEDICINE

## 2022-08-15 LAB — REF LAB TEST METHOD: NORMAL

## 2022-08-17 ENCOUNTER — OFFICE VISIT (OUTPATIENT)
Dept: ENDOCRINOLOGY | Facility: CLINIC | Age: 42
End: 2022-08-17

## 2022-08-17 VITALS
SYSTOLIC BLOOD PRESSURE: 122 MMHG | BODY MASS INDEX: 41.68 KG/M2 | DIASTOLIC BLOOD PRESSURE: 78 MMHG | WEIGHT: 226.5 LBS | HEIGHT: 62 IN | HEART RATE: 100 BPM | OXYGEN SATURATION: 97 %

## 2022-08-17 DIAGNOSIS — C73 FOLLICULAR THYROID CANCER: Primary | ICD-10-CM

## 2022-08-17 DIAGNOSIS — E89.0 POSTOPERATIVE HYPOTHYROIDISM: ICD-10-CM

## 2022-08-17 PROCEDURE — 99214 OFFICE O/P EST MOD 30 MIN: CPT | Performed by: INTERNAL MEDICINE

## 2022-08-17 RX ORDER — LEVOTHYROXINE SODIUM 125 MCG
125 TABLET ORAL DAILY
Qty: 90 TABLET | Refills: 3 | Status: SHIPPED | OUTPATIENT
Start: 2022-08-17 | End: 2022-08-17 | Stop reason: SDUPTHER

## 2022-08-17 RX ORDER — LEVOTHYROXINE SODIUM 125 MCG
125 TABLET ORAL DAILY
Qty: 90 TABLET | Refills: 3 | Status: SHIPPED | OUTPATIENT
Start: 2022-08-17 | End: 2023-08-17

## 2022-08-17 NOTE — PROGRESS NOTES
Thyroid Cancer and Hypothyroidism (Follow UP)    Subjective   Roberta Casillas is a 42 y.o. female. 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 Patient is s/p adjuvant I-131 therapy, which she received on 03/25/2021 (100 mCi). TB uptake on the WBS was seen.   12/17/2021 patient underwent WBS with hypothyroid preparation - negative, Tg is 0.1        Medications:    Current Outpatient Medications:   •  fluticasone (FLONASE) 50 MCG/ACT nasal spray, 2 sprays into each nostril Daily., Disp: , Rfl:   •  lansoprazole (PREVACID) 30 MG capsule, Take 30 mg by mouth Daily., Disp: , Rfl:   •  levocetirizine (XYZAL) 5 MG tablet, Take 5 mg by mouth Daily., Disp: , Rfl:   •  methocarbamol (ROBAXIN) 500 MG tablet, , Disp: , Rfl: 0  •  Norgestim-Eth Estrad Triphasic (ORTHO TRI-CYCLEN, 28, PO), Take  by mouth., Disp: , Rfl:   •  RA VITAMIN D-3 125 MCG (5000 UT) capsule, Take 5,000 Units by mouth Daily., Disp: , Rfl: 0  •  sertraline (ZOLOFT) 25 MG tablet, Take 25 mg by mouth Daily., Disp: , Rfl:   •  Synthroid 125 MCG tablet, Take 1 tablet by mouth Daily., Disp: 90 tablet, Rfl: 3  •  liothyronine (CYTOMEL) 25 MCG tablet, Take 1 tablet by mouth Daily. Take 1 tab twice a day for 2 days, then 1/2 tab twice a day for 3 days, then 1/2 tab once a day for another 3 days., Disp: 30 tablet, Rfl: 0    Current Facility-Administered Medications:   •  thyrotropin andres (THYROGEN) IM solution 0.9 mg, 0.9 mg, Intramuscular, Q24H, Sherita Rivera MD, 0.9 mg at 12/13/21 1550  •  thyrotropin andres (THYROGEN) IM solution 0.9 mg, 0.9 mg, Intramuscular, Q24H, Sherita Rivera MD, 0.9 mg at 12/14/21 1200      Review of Systems   Constitutional: Positive for fatigue and unexpected weight gain.   HENT: Positive for congestion.    Eyes: Positive for itching.   Gastrointestinal: Positive for  "constipation and GERD.   Neurological: Positive for weakness.   Hematological: Bruises/bleeds easily.   Psychiatric/Behavioral: The patient is nervous/anxious.    All other systems reviewed and are negative.      Objective   Blood pressure 122/78, pulse 100, height 157.5 cm (62\"), weight 103 kg (226 lb 8 oz), SpO2 97 %. Body mass index is 41.43 kg/m².  Physical Exam   Constitutional: She is oriented to person, place, and time.   Neck:   Postop scar in good condition    Cardiovascular: Normal rate, regular rhythm, normal heart sounds and normal pulses.   Abdominal: Normal appearance.   Lymphadenopathy:     She has no cervical adenopathy.   Neurological: She is alert and oriented to person, place, and time.         Results for orders placed or performed in visit on 08/12/22   T4, Free    Specimen: Blood   Result Value Ref Range    Free T4 1.56 0.93 - 1.70 ng/dL   TSH    Specimen: Blood   Result Value Ref Range    TSH 0.810 0.270 - 4.200 uIU/mL   Thyroglobulin + Thyroglobulin Antibody ([place])    Specimen: Blood   Result Value Ref Range    Reference Lab Report See Attached Report            Assessment/Plan:    Problem List Items Addressed This Visit        Other    Follicular thyroid cancer (HCC) - Primary    Overview     Follicular thyroid carcinoma - 1.8 cm with vascular invasion.   Date TSH Thyroglobulin WBS CT/PET scan ultrasound I-131 therapy Comments   3/25/2021  14.9 TB uptake   100 mCi    7/22/2021 0.2, high fT4 0.1     137-->125 mcg    12/21/2021  0.1 Negative                                                                                      Relevant Medications    Synthroid 125 MCG tablet    Postoperative hypothyroidism    Relevant Medications    Synthroid 125 MCG tablet      Follicular thyroid carcinoma. 1.8 cm with vascular invasion. S/p I-131 therapy 100 mCi in 3/25/2021, WBS showed thyroid  uptake only.   12/2021 WBS was negative and Stimulated Tg level is 0.1, favorable results. No evidence of recurrence. "   Plan for follow-up scan      Synthroid  125 mcg daily. Thyroid level is normal     Follow-up in 4-6  Months

## 2022-08-17 NOTE — PATIENT INSTRUCTIONS
Dec 19 and 20  Thyrogen injections  Dec 21, 23 - Nuclear medicine appointment   Dec 22 - obtain a blood work for Thyroglobulin.   Start low iodine diet on Dec 5  Stop  medication on Dec 16 - 17

## 2022-08-18 ENCOUNTER — TELEPHONE (OUTPATIENT)
Dept: ENDOCRINOLOGY | Facility: CLINIC | Age: 42
End: 2022-08-18

## 2022-08-18 NOTE — TELEPHONE ENCOUNTER
Pt notified rx was sent yesterday with recpt confirmed     Synthroid 125 MCG tablet [[id number]]     Order Details  Dose: 125 mcg Route: Oral Frequency: Daily   Dispense Quantity: 90 tablet Refills: 3          Sig: Take 1 tablet by mouth Daily.         Start Date: 08/17/22 End Date: 08/17/23 after 365 doses   Written Date: 08/17/22 Expiration Date: 08/17/23   Original Order:  Synthroid 125 MCG tablet [[id number]]     Providers    Ordering Provider and Authorizing Provider:    Sherita Rivera MD   [facility] [address]   Phone:  [phone]   Fax:  [phone]   NPI:  [id number]        Ordering User:  Sherita Rivera MD          Pharmacy    [id number] [place], [place] - [address] [place] [address] AT [facility] - [phone]  - [phone]    [address] [address]   Phone:  [phone]  Fax:  [phone]           Orders with any of the following pharmaceutical classes: Thyroid    Name Dose Frequency Start Date End Date Medication Warnings Interventions? Order Mode    liothyronine (CYTOMEL) 25 MCG tablet 25 mcg Daily 12/17/21 12/17/22   Outpatient    Synthroid 125 MCG tablet 125 mcg Daily 10/19/21 08/17/22   Outpatient    Synthroid 125 MCG tablet 125 mcg Daily 08/17/22 08/17/22   Outpatient          E-Prescribing Status      Outpatient Medication Detail    Synthroid 125 MCG tablet        Sig: Take 1 tablet by mouth Daily.        Sent to pharmacy as: Synthroid 125 MCG Oral Tablet        Class: Normal        Route: Oral        E-Prescribing Status: Receipt confirmed by pharmacy (8/17/2022  3:54 PM EDT)

## 2022-09-19 ENCOUNTER — TELEPHONE (OUTPATIENT)
Dept: ENDOCRINOLOGY | Facility: CLINIC | Age: 42
End: 2022-09-19

## 2022-11-15 ENCOUNTER — TELEPHONE (OUTPATIENT)
Dept: ENDOCRINOLOGY | Facility: CLINIC | Age: 42
End: 2022-11-15

## 2022-11-15 NOTE — TELEPHONE ENCOUNTER
Pt was calling back to check on her disability forms that she refaxed to us   Its for the radioactive iodine 3/2021 this is what it is for    please call the pt back  [phone]

## 2022-11-15 NOTE — TELEPHONE ENCOUNTER
Returned pt call she stated the dates were incorrect. I have corrected them and refaxed them back to her

## 2022-12-13 ENCOUNTER — TELEPHONE (OUTPATIENT)
Dept: ENDOCRINOLOGY | Facility: CLINIC | Age: 42
End: 2022-12-13

## 2022-12-13 NOTE — TELEPHONE ENCOUNTER
PATIENT IS CALLING WANTING TO KNOW WHEN SHE IS GOING TO HAVE HERE RADIOACTIVE IODINE AND THYROGEN SHOTS. SHE HAS ALREADY STARTED THE LOW IODINE DIET AS OF 12/5/22 AND WANTS TO BE OFF THE LOW IODINE DIET BEFORE JOSE SO SHE CAN HAVE JOSE DINNER WITH HER FAMILY. SHE NEEDS ORDERS PLACED IN CHART AND INSTRUCTIONS. PHONE NUMBER [phone]

## 2022-12-14 DIAGNOSIS — C73 FOLLICULAR THYROID CANCER: Primary | ICD-10-CM

## 2022-12-19 ENCOUNTER — INFUSION (OUTPATIENT)
Dept: ONCOLOGY | Facility: HOSPITAL | Age: 42
End: 2022-12-19

## 2022-12-19 ENCOUNTER — APPOINTMENT (OUTPATIENT)
Dept: MAMMOGRAPHY | Facility: HOSPITAL | Age: 42
End: 2022-12-19

## 2022-12-19 VITALS
SYSTOLIC BLOOD PRESSURE: 109 MMHG | HEART RATE: 91 BPM | DIASTOLIC BLOOD PRESSURE: 72 MMHG | TEMPERATURE: 98.4 F | RESPIRATION RATE: 18 BRPM

## 2022-12-19 DIAGNOSIS — C73 FOLLICULAR THYROID CANCER: Primary | ICD-10-CM

## 2022-12-19 PROCEDURE — 25010000002 THYROTROPIN ALFA 0.9 MG RECONSTITUTED SOLUTION: Performed by: INTERNAL MEDICINE

## 2022-12-19 PROCEDURE — 96372 THER/PROPH/DIAG INJ SC/IM: CPT

## 2022-12-19 RX ADMIN — THYROTROPIN ALFA 0.9 MG: 0.9 INJECTION, POWDER, FOR SOLUTION INTRAMUSCULAR at 15:17

## 2022-12-20 ENCOUNTER — INFUSION (OUTPATIENT)
Dept: ONCOLOGY | Facility: HOSPITAL | Age: 42
End: 2022-12-20

## 2022-12-20 VITALS
HEART RATE: 97 BPM | DIASTOLIC BLOOD PRESSURE: 71 MMHG | RESPIRATION RATE: 18 BRPM | SYSTOLIC BLOOD PRESSURE: 118 MMHG | TEMPERATURE: 97.7 F

## 2022-12-20 DIAGNOSIS — C73 FOLLICULAR THYROID CANCER: Primary | ICD-10-CM

## 2022-12-20 PROCEDURE — 96372 THER/PROPH/DIAG INJ SC/IM: CPT

## 2022-12-20 PROCEDURE — 25010000002 THYROTROPIN ALFA 0.9 MG RECONSTITUTED SOLUTION: Performed by: INTERNAL MEDICINE

## 2022-12-20 RX ADMIN — THYROTROPIN ALFA 0.9 MG: 0.9 INJECTION, POWDER, FOR SOLUTION INTRAMUSCULAR at 13:43

## 2022-12-21 ENCOUNTER — HOSPITAL ENCOUNTER (OUTPATIENT)
Dept: NUCLEAR MEDICINE | Facility: HOSPITAL | Age: 42
Discharge: HOME | End: 2022-12-21

## 2022-12-21 ENCOUNTER — LAB (OUTPATIENT)
Dept: LAB | Facility: HOSPITAL | Age: 42
End: 2022-12-21

## 2022-12-21 DIAGNOSIS — C73 FOLLICULAR THYROID CANCER: ICD-10-CM

## 2022-12-21 LAB — HCG INTACT+B SERPL-ACNC: 0.23 MIU/ML

## 2022-12-21 PROCEDURE — A9528 IODINE I-131 IODIDE CAP, DX: HCPCS | Performed by: INTERNAL MEDICINE

## 2022-12-21 PROCEDURE — 84702 CHORIONIC GONADOTROPIN TEST: CPT | Performed by: STUDENT IN AN ORGANIZED HEALTH CARE EDUCATION/TRAINING PROGRAM

## 2022-12-21 PROCEDURE — 0 SODIUM IODIDE CAPSULE: Performed by: INTERNAL MEDICINE

## 2022-12-21 PROCEDURE — 78018 THYROID MET IMAGING BODY: CPT

## 2022-12-21 RX ADMIN — SODIUM IODIDE I 131 1 CAPSULE: 100 CAPSULE ORAL at 09:41

## 2022-12-22 ENCOUNTER — TELEPHONE (OUTPATIENT)
Dept: ENDOCRINOLOGY | Facility: CLINIC | Age: 42
End: 2022-12-22

## 2022-12-22 ENCOUNTER — LAB (OUTPATIENT)
Dept: ENDOCRINOLOGY | Facility: CLINIC | Age: 42
End: 2022-12-22

## 2022-12-22 ENCOUNTER — LAB (OUTPATIENT)
Dept: LAB | Facility: HOSPITAL | Age: 42
End: 2022-12-22

## 2022-12-22 DIAGNOSIS — C73 FOLLICULAR THYROID CANCER: ICD-10-CM

## 2022-12-22 PROCEDURE — 84443 ASSAY THYROID STIM HORMONE: CPT

## 2022-12-22 PROCEDURE — 86800 THYROGLOBULIN ANTIBODY: CPT | Performed by: INTERNAL MEDICINE

## 2022-12-22 PROCEDURE — 36415 COLL VENOUS BLD VENIPUNCTURE: CPT

## 2022-12-22 PROCEDURE — 84439 ASSAY OF FREE THYROXINE: CPT

## 2022-12-22 PROCEDURE — 84432 ASSAY OF THYROGLOBULIN: CPT | Performed by: INTERNAL MEDICINE

## 2022-12-22 PROCEDURE — 84703 CHORIONIC GONADOTROPIN ASSAY: CPT

## 2022-12-22 NOTE — TELEPHONE ENCOUNTER
Pt stopped by today to have Labs done before her scan. She stated she is having her scan tomorrow and wanted to know how long she has to wait to know when she can eat. She would like know ASAP so she can eat forChristmas. I told her I would send you a message to give you a heads up about the results tomorrow.

## 2022-12-23 ENCOUNTER — HOSPITAL ENCOUNTER (OUTPATIENT)
Dept: NUCLEAR MEDICINE | Facility: HOSPITAL | Age: 42
Discharge: HOME | End: 2022-12-23

## 2022-12-23 LAB
HCG SERPL QL: NEGATIVE
T4 FREE SERPL-MCNC: 0.93 NG/DL (ref 0.93–1.7)
TSH SERPL DL<=0.05 MIU/L-ACNC: 41.6 UIU/ML (ref 0.27–4.2)

## 2022-12-30 ENCOUNTER — HOSPITAL ENCOUNTER (OUTPATIENT)
Dept: MAMMOGRAPHY | Facility: HOSPITAL | Age: 42
Discharge: HOME | End: 2022-12-30

## 2022-12-30 ENCOUNTER — HOSPITAL ENCOUNTER (OUTPATIENT)
Dept: ULTRASOUND IMAGING | Facility: HOSPITAL | Age: 42
Discharge: HOME | End: 2022-12-30

## 2022-12-30 ENCOUNTER — TRANSCRIBE ORDERS (OUTPATIENT)
Dept: MAMMOGRAPHY | Facility: HOSPITAL | Age: 42
End: 2022-12-30
Payer: COMMERCIAL

## 2022-12-30 DIAGNOSIS — R92.8 ABNORMAL MAMMOGRAM: ICD-10-CM

## 2022-12-30 DIAGNOSIS — R92.8 ABNORMAL MAMMOGRAM: Primary | ICD-10-CM

## 2022-12-30 PROCEDURE — 77066 DX MAMMO INCL CAD BI: CPT

## 2022-12-30 PROCEDURE — 77066 DX MAMMO INCL CAD BI: CPT | Performed by: RADIOLOGY

## 2022-12-30 PROCEDURE — 76642 ULTRASOUND BREAST LIMITED: CPT

## 2022-12-30 PROCEDURE — 76642 ULTRASOUND BREAST LIMITED: CPT | Performed by: RADIOLOGY

## 2023-03-08 ENCOUNTER — LAB (OUTPATIENT)
Dept: LAB | Facility: HOSPITAL | Age: 43
End: 2023-03-08
Payer: COMMERCIAL

## 2023-03-08 DIAGNOSIS — C73 FOLLICULAR THYROID CANCER: Primary | ICD-10-CM

## 2023-03-08 DIAGNOSIS — C73 FOLLICULAR THYROID CANCER: ICD-10-CM

## 2023-03-08 LAB
T4 FREE SERPL-MCNC: 1.68 NG/DL (ref 0.93–1.7)
TSH SERPL DL<=0.05 MIU/L-ACNC: 1.15 UIU/ML (ref 0.27–4.2)

## 2023-03-08 PROCEDURE — 84443 ASSAY THYROID STIM HORMONE: CPT

## 2023-03-08 PROCEDURE — 84439 ASSAY OF FREE THYROXINE: CPT

## 2023-03-15 ENCOUNTER — OFFICE VISIT (OUTPATIENT)
Dept: ENDOCRINOLOGY | Facility: CLINIC | Age: 43
End: 2023-03-15
Payer: COMMERCIAL

## 2023-03-15 VITALS
OXYGEN SATURATION: 98 % | SYSTOLIC BLOOD PRESSURE: 124 MMHG | HEIGHT: 62 IN | DIASTOLIC BLOOD PRESSURE: 80 MMHG | BODY MASS INDEX: 40.85 KG/M2 | HEART RATE: 114 BPM | WEIGHT: 222 LBS

## 2023-03-15 DIAGNOSIS — E89.0 POSTOPERATIVE HYPOTHYROIDISM: ICD-10-CM

## 2023-03-15 DIAGNOSIS — C73 FOLLICULAR THYROID CANCER: Primary | ICD-10-CM

## 2023-03-15 PROCEDURE — 99214 OFFICE O/P EST MOD 30 MIN: CPT | Performed by: INTERNAL MEDICINE

## 2023-03-15 NOTE — PROGRESS NOTES
Thyroid Cancer (Follow up )    Dilan Casillas is a 42 y.o. female. 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 Patient is s/p adjuvant I-131 therapy, which she received on 03/25/2021 (100 mCi). TB uptake on the WBS was seen.   12/17/2021 patient underwent WBS with hypothyroid preparation - negative, Tg is 0.1  12/28/22 WBS repeated and negative, TG < 0.1     She presented for routine follow-up, had labs done recently. No complaints.     Medications:    Current Outpatient Medications:   •  fluticasone (FLONASE) 50 MCG/ACT nasal spray, 2 sprays into the nostril(s) as directed by provider Daily., Disp: , Rfl:   •  lansoprazole (PREVACID) 30 MG capsule, Take 1 capsule by mouth Daily., Disp: , Rfl:   •  levocetirizine (XYZAL) 5 MG tablet, Take 1 tablet by mouth Daily., Disp: , Rfl:   •  methocarbamol (ROBAXIN) 500 MG tablet, , Disp: , Rfl: 0  •  Norgestim-Eth Estrad Triphasic (ORTHO TRI-CYCLEN, 28, PO), Take  by mouth., Disp: , Rfl:   •  RA VITAMIN D-3 125 MCG (5000 UT) capsule, Take 1 capsule by mouth Daily., Disp: , Rfl: 0  •  sertraline (ZOLOFT) 25 MG tablet, Take 1 tablet by mouth Daily., Disp: , Rfl:   •  Synthroid 125 MCG tablet, Take 1 tablet by mouth Daily., Disp: 90 tablet, Rfl: 3  •  cefdinir (OMNICEF) 300 MG capsule, Take 1 capsule by mouth 2 (Two) Times a Day., Disp: 20 capsule, Rfl: 0  •  ciprofloxacin-dexamethasone (CIPRODEX) 0.3-0.1 % otic suspension, Administer 4 drops to the right ear 2 (Two) Times a Day., Disp: 1 each, Rfl: 0  •  liothyronine (CYTOMEL) 25 MCG tablet, Take 1 tablet by mouth Daily. Take 1 tab twice a day for 2 days, then 1/2 tab twice a day for 3 days, then 1/2 tab once a day for another 3 days., Disp: 30 tablet, Rfl: 0  •  methylPREDNISolone (MEDROL) 4 MG dose pack, Take as directed on package  "instructions., Disp: 21 each, Rfl: 0  No current facility-administered medications for this visit.      Review of Systems   Constitutional: Positive for fatigue and unexpected weight gain.   Eyes: Positive for itching.   Neurological: Positive for weakness.   All other systems reviewed and are negative.      Objective   Blood pressure 124/80, pulse 114, height 157.5 cm (62\"), weight 101 kg (222 lb), SpO2 98 %. Body mass index is 40.6 kg/m².  Physical Exam   Constitutional: She is oriented to person, place, and time.   Neck:   Postop scar in good condition    Cardiovascular: Normal rate, regular rhythm, normal heart sounds and normal pulses.   Abdominal: Normal appearance.   Musculoskeletal: No swelling.   Lymphadenopathy:     She has no cervical adenopathy.   Neurological: She is alert and oriented to person, place, and time.         Results for orders placed or performed in visit on 03/08/23   TSH    Specimen: Blood   Result Value Ref Range    TSH 1.150 0.270 - 4.200 uIU/mL   T4, Free    Specimen: Blood   Result Value Ref Range    Free T4 1.68 0.93 - 1.70 ng/dL           Assessment/Plan:    Problem List Items Addressed This Visit        Other    Follicular thyroid cancer (HCC) - Primary    Overview     Follicular thyroid carcinoma - 1.8 cm with vascular invasion.   Date TSH Thyroglobulin WBS CT/PET scan ultrasound I-131 therapy Comments   3/25/2021  14.9 TB uptake   100 mCi    7/22/2021 0.2, high fT4 0.1     137-->125 mcg    12/21/2021  0.1 Negative       08/18/22 0.8 < 0.1        12/28/22 40 <0.1 (stimulated) negative                                                                  Postoperative hypothyroidism   Follicular thyroid carcinoma. 1.8 cm with vascular invasion. S/p I-131 therapy 100 mCi in 3/25/2021, WBS showed thyroid  uptake only.   12/2021 WBS was negative and Stimulated Tg level is 0.1, favorable results. No evidence of recurrence.   12/28/22 - WBS is negative with Tg < 0.1     Synthroid  125 mcg " daily. TFT are normal with TSH 1.1. Considering her prognosis and negative WBS/TG levels no need to increase the dose for TSH suppression.     Follow-up in 10 months, will do u/s at that time.

## 2023-07-24 ENCOUNTER — TRANSCRIBE ORDERS (OUTPATIENT)
Dept: ADMINISTRATIVE | Facility: HOSPITAL | Age: 43
End: 2023-07-24
Payer: COMMERCIAL

## 2023-07-24 DIAGNOSIS — R92.8 ABNORMAL MAMMOGRAM: Primary | ICD-10-CM

## 2023-07-27 ENCOUNTER — TELEPHONE (OUTPATIENT)
Dept: ENDOCRINOLOGY | Facility: CLINIC | Age: 43
End: 2023-07-27

## 2023-07-27 DIAGNOSIS — C73 FOLLICULAR THYROID CANCER: Primary | ICD-10-CM

## 2023-07-27 NOTE — TELEPHONE ENCOUNTER
Caller: Roberta Casillas    Relationship to patient: Self    Best call back number: [phone]    Patient is needing: PT WANTS TO KNOW WHEN SHE IS SUPPOSED TO DO BLOOD WORK. APPT IS 12/15

## 2023-09-25 RX ORDER — LEVOTHYROXINE SODIUM 125 MCG
TABLET ORAL
Qty: 90 TABLET | Refills: 0 | Status: SHIPPED | OUTPATIENT
Start: 2023-09-25

## 2023-09-25 NOTE — TELEPHONE ENCOUNTER
Rx Refill Note  Requested Prescriptions     Pending Prescriptions Disp Refills    Synthroid 125 MCG tablet [Pharmacy Med Name: SYNTHROID 125 MCG TABLET] 90 tablet 3     Sig: TAKE 1 TABLET BY MOUTH EVERY DAY      Last office visit with prescribing clinician: 3/15/2023     Next office visit with prescribing clinician: 12/15/2023       Shilpi Rhodes MA  09/25/23, 10:49 EDT

## 2023-09-28 ENCOUNTER — PRIOR AUTHORIZATION (OUTPATIENT)
Dept: ENDOCRINOLOGY | Facility: CLINIC | Age: 43
End: 2023-09-28
Payer: COMMERCIAL

## 2023-09-28 ENCOUNTER — TELEPHONE (OUTPATIENT)
Dept: ENDOCRINOLOGY | Facility: CLINIC | Age: 43
End: 2023-09-28

## 2023-09-28 NOTE — TELEPHONE ENCOUNTER
PATIENT STATES HER PHARMACY HAS SENT A REQUEST FOR PRIOR AUTH FOR HER SYNTHROID MEDICATION. SHE IS CALLING TO CHECK STATUS OF PRIOR AUTH. SHE ONLY HAS 2 PILLS LEFT AND WILL BE OUT OF MEDICATION. PHONE NUMBER [phone]

## 2023-09-28 NOTE — TELEPHONE ENCOUNTER
Approvedtoday  Your PA request has been approved. Additional information will be provided in the approval communication. (Message 1145)  Drug  Synthroid 125MCG tablets  Form  [employer] Electronic PA Form (2017 N[employer])

## 2023-11-29 ENCOUNTER — HOSPITAL ENCOUNTER (OUTPATIENT)
Dept: MRI IMAGING | Facility: HOSPITAL | Age: 43
Discharge: HOME | End: 2023-11-29
Admitting: OBSTETRICS & GYNECOLOGY
Payer: COMMERCIAL

## 2023-11-29 DIAGNOSIS — Z12.39 BREAST CANCER SCREENING, HIGH RISK PATIENT: ICD-10-CM

## 2023-11-29 PROCEDURE — A9577 INJ MULTIHANCE: HCPCS | Performed by: OBSTETRICS & GYNECOLOGY

## 2023-11-29 PROCEDURE — 0 GADOBENATE DIMEGLUMINE 529 MG/ML SOLUTION: Performed by: OBSTETRICS & GYNECOLOGY

## 2023-11-29 PROCEDURE — 77049 MRI BREAST C-+ W/CAD BI: CPT

## 2023-11-29 RX ADMIN — GADOBENATE DIMEGLUMINE 19 ML: 529 INJECTION, SOLUTION INTRAVENOUS at 15:55

## 2023-12-04 ENCOUNTER — LAB (OUTPATIENT)
Dept: LAB | Facility: HOSPITAL | Age: 43
End: 2023-12-04
Payer: COMMERCIAL

## 2023-12-04 ENCOUNTER — TELEPHONE (OUTPATIENT)
Dept: MRI IMAGING | Facility: HOSPITAL | Age: 43
End: 2023-12-04
Payer: COMMERCIAL

## 2023-12-04 DIAGNOSIS — C73 FOLLICULAR THYROID CANCER: ICD-10-CM

## 2023-12-04 LAB
T4 FREE SERPL-MCNC: 1.84 NG/DL (ref 0.93–1.7)
TSH SERPL DL<=0.05 MIU/L-ACNC: 0.14 UIU/ML (ref 0.27–4.2)

## 2023-12-04 PROCEDURE — 84432 ASSAY OF THYROGLOBULIN: CPT

## 2023-12-04 PROCEDURE — 84443 ASSAY THYROID STIM HORMONE: CPT

## 2023-12-04 PROCEDURE — 36415 COLL VENOUS BLD VENIPUNCTURE: CPT

## 2023-12-04 PROCEDURE — 86800 THYROGLOBULIN ANTIBODY: CPT

## 2023-12-04 PROCEDURE — 84439 ASSAY OF FREE THYROXINE: CPT

## 2023-12-05 LAB
THYROGLOB AB SERPL-ACNC: <1 IU/ML (ref 0–0.9)
THYROGLOB SERPL-MCNC: <0.1 NG/ML (ref 1.5–38.5)

## 2023-12-15 ENCOUNTER — OFFICE VISIT (OUTPATIENT)
Dept: ENDOCRINOLOGY | Facility: CLINIC | Age: 43
End: 2023-12-15
Payer: COMMERCIAL

## 2023-12-15 VITALS
HEART RATE: 88 BPM | SYSTOLIC BLOOD PRESSURE: 128 MMHG | DIASTOLIC BLOOD PRESSURE: 62 MMHG | HEIGHT: 62 IN | BODY MASS INDEX: 41.59 KG/M2 | WEIGHT: 226 LBS

## 2023-12-15 DIAGNOSIS — C73 FOLLICULAR THYROID CANCER: Primary | ICD-10-CM

## 2023-12-15 DIAGNOSIS — E89.0 POSTOPERATIVE HYPOTHYROIDISM: ICD-10-CM

## 2023-12-15 RX ORDER — LEVOTHYROXINE SODIUM 125 MCG
125 TABLET ORAL DAILY
Qty: 90 TABLET | Refills: 3 | Status: SHIPPED | OUTPATIENT
Start: 2023-12-15 | End: 2023-12-15

## 2023-12-15 RX ORDER — LEVOTHYROXINE SODIUM 112 MCG
112 TABLET ORAL DAILY
Qty: 90 TABLET | Refills: 3 | Status: SHIPPED | OUTPATIENT
Start: 2023-12-15 | End: 2024-12-14

## 2023-12-15 RX ORDER — LEVOTHYROXINE SODIUM 112 MCG
112 TABLET ORAL DAILY
Qty: 30 TABLET | Refills: 11 | Status: SHIPPED | OUTPATIENT
Start: 2023-12-15 | End: 2023-12-15 | Stop reason: SDUPTHER

## 2023-12-15 NOTE — PROGRESS NOTES
"Thyroid Problem    Subjective   Roberta Casillas is a 43 y.o. female. 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 Patient is s/p adjuvant I-131 therapy, which she received on 03/25/2021 (100 mCi). TB uptake on the WBS was seen.   12/17/2021 patient underwent WBS with hypothyroid preparation - negative, Tg is 0.1  12/28/22 WBS repeated and negative, TG < 0.1     She presented for routine follow-up, had labs done recently. No complaints.       Medications:    Current Outpatient Medications:     fluticasone (FLONASE) 50 MCG/ACT nasal spray, 2 sprays into the nostril(s) as directed by provider Daily., Disp: , Rfl:     lansoprazole (PREVACID) 30 MG capsule, Take 1 capsule by mouth Daily., Disp: , Rfl:     levocetirizine (XYZAL) 5 MG tablet, Take 1 tablet by mouth Daily., Disp: , Rfl:     methocarbamol (ROBAXIN) 500 MG tablet, , Disp: , Rfl: 0    Norgestim-Eth Estrad Triphasic (ORTHO TRI-CYCLEN, 28, PO), Take  by mouth., Disp: , Rfl:     RA VITAMIN D-3 125 MCG (5000 UT) capsule, Take 1 capsule by mouth Daily., Disp: , Rfl: 0    sertraline (ZOLOFT) 25 MG tablet, Take 1 tablet by mouth Daily., Disp: , Rfl:     Synthroid 125 MCG tablet, TAKE 1 TABLET BY MOUTH EVERY DAY, Disp: 90 tablet, Rfl: 0      Review of Systems   Constitutional:  Positive for fatigue and unexpected weight gain.   Eyes:  Positive for itching.   Neurological:  Positive for weakness.   All other systems reviewed and are negative.      Objective   Blood pressure 128/62, pulse 88, height 157.5 cm (62.01\"), weight 103 kg (226 lb). Body mass index is 41.33 kg/m².  Physical Exam   Constitutional: She is oriented to person, place, and time.   Neck:   Postop scar in good condition    Cardiovascular: Normal rate, regular rhythm, normal heart sounds and normal pulses.   Abdominal: Normal " appearance.   Musculoskeletal: No swelling.   Lymphadenopathy:     She has no cervical adenopathy.   Neurological: She is alert and oriented to person, place, and time.     Post-operative Neck Ultrasound  [facility]    PT: Roberta Casillas  : 1980  Date:  12/15/23  Indication: History of thyroid cancer.    Technique: Real time high resolution imaging of the anterior neck was performed in longitudinal and transverse planes.    Findings:   Compartment / Level I  (Submandibular): no abnormal lymph nodules bilaterally  Compartment / Level II  (Suprahyoid parajugular): no abnormal lymph nodules bilaterally  Compartment / Level III  (Infrahyoid supracricoid parajugular): no abnormal lymph nodules bilaterally  Compartment / Level IV (Infracricoid parajugular): no abnormal lymph nodules bilaterally  Compartment / Level V (Posterior cervical triangle): no abnormal lymph nodules bilaterally  Compartment / Level VI (Central compartment): no abnormal lymph nodules identified  Compartment / Level VII (Substernal space): no abnormal lymph nodules bilaterally    Impression: The structures of the neck are shifted medially as expected post thyroidectomy. The thyroid bed is unremarkable.   No abnormal lymph nodes were identified in surgical anatomic compartments I - VII.      Recommendation: Repeat Neck US recommended as clinically indicated.      Signed: Sherita Rivera MD         Results for orders placed or performed in visit on 23   Thyroglobulin Antibody and Thyroglobulin, EBONY or LC/MS-MS    Specimen: Blood   Result Value Ref Range    Thyroglobulin Ab <1.0 0.0 - 0.9 IU/mL   T4, Free    Specimen: Blood   Result Value Ref Range    Free T4 1.84 (H) 0.93 - 1.70 ng/dL   TSH    Specimen: Blood   Result Value Ref Range    TSH 0.145 (L) 0.270 - 4.200 uIU/mL   Thyroglobulin By EBONY    Specimen: Blood   Result Value Ref Range    THYROGLOBULIN BY EBONY <0.1 (L) 1.5 - 38.5 ng/mL            Assessment/Plan:    Problem List Items Addressed This Visit          Other    Follicular thyroid cancer - Primary    Overview     Follicular thyroid carcinoma - 1.8 cm with vascular invasion.   Date TSH Thyroglobulin WBS CT/PET scan ultrasound I-131 therapy Comments   3/25/2021  14.9 TB uptake   100 mCi    7/22/2021 0.2, high fT4 0.1     137-->125 mcg    12/21/2021  0.1 Negative       08/18/22 0.8 < 0.1        12/28/22 40 <0.1 (stimulated) negative                                                                  Postoperative hypothyroidism   Follicular thyroid carcinoma. 1.8 cm with vascular invasion. S/p I-131 therapy 100 mCi in 3/25/2021, WBS showed thyroid  uptake only.   12/2021 WBS was negative and Stimulated Tg level is 0.1, favorable results. No evidence of recurrence.   12/28/22 - WBS is negative with Tg < 0.1. Thyrogen     Synthroid  125 mcg daily--> 112 mcg . TFT showed high thyroid function. TG level is < 0.1  Neck ultrasound is unremarkable.   Follow-up in 10 months

## 2023-12-28 ENCOUNTER — HOSPITAL ENCOUNTER (OUTPATIENT)
Dept: ULTRASOUND IMAGING | Facility: HOSPITAL | Age: 43
Discharge: HOME | End: 2023-12-28
Payer: COMMERCIAL

## 2023-12-28 ENCOUNTER — HOSPITAL ENCOUNTER (OUTPATIENT)
Dept: MAMMOGRAPHY | Facility: HOSPITAL | Age: 43
Discharge: HOME | End: 2023-12-28
Payer: COMMERCIAL

## 2023-12-28 DIAGNOSIS — R92.8 ABNORMAL MRI, BREAST: ICD-10-CM

## 2023-12-28 DIAGNOSIS — Z12.39 BREAST CANCER SCREENING, HIGH RISK PATIENT: ICD-10-CM

## 2023-12-28 PROCEDURE — A4648 IMPLANTABLE TISSUE MARKER: HCPCS

## 2023-12-28 PROCEDURE — 88305 TISSUE EXAM BY PATHOLOGIST: CPT | Performed by: RADIOLOGY

## 2023-12-28 PROCEDURE — 25010000002 CHLOROPROCAINE HCL (PF) 3 % SOLUTION: Performed by: RADIOLOGY

## 2023-12-28 RX ORDER — CHLOROPROCAINE HYDROCHLORIDE 30 MG/ML
5 INJECTION, SOLUTION EPIDURAL; INFILTRATION; INTRACAUDAL; PERINEURAL ONCE
Status: COMPLETED | OUTPATIENT
Start: 2023-12-28 | End: 2023-12-28

## 2023-12-28 RX ADMIN — CHLOROPROCAINE HYDROCHLORIDE 4 ML: 30 INJECTION, SOLUTION EPIDURAL; INFILTRATION; INTRACAUDAL; PERINEURAL at 10:15

## 2023-12-28 NOTE — PROGRESS NOTES
Alert and orientated. Denies discomfort, no active bleeding, steri-strips not visualized, gauze dressing intact. Patient requests Dr AXEL Skelton ([facility]) for surgical consult if needed.  Cold packs given. Verbalizes and demonstrates understanding of post-care instructions, written copy given.

## 2023-12-29 ENCOUNTER — TELEPHONE (OUTPATIENT)
Dept: MAMMOGRAPHY | Facility: HOSPITAL | Age: 43
End: 2023-12-29
Payer: COMMERCIAL

## 2023-12-29 NOTE — TELEPHONE ENCOUNTER
Patient notified of pathology results and recommendation. Verbalizes understanding. States having some discomfort, using analgesics with relief. Denies signs and symptoms of infection. Questions answered, support given, verbalized understanding.

## 2024-02-29 ENCOUNTER — LAB (OUTPATIENT)
Dept: LAB | Facility: HOSPITAL | Age: 44
End: 2024-02-29
Payer: COMMERCIAL

## 2024-02-29 DIAGNOSIS — E89.0 POSTOPERATIVE HYPOTHYROIDISM: ICD-10-CM

## 2024-02-29 PROCEDURE — 84439 ASSAY OF FREE THYROXINE: CPT

## 2024-02-29 PROCEDURE — 36415 COLL VENOUS BLD VENIPUNCTURE: CPT

## 2024-02-29 PROCEDURE — 84443 ASSAY THYROID STIM HORMONE: CPT

## 2024-03-01 LAB
T4 FREE SERPL-MCNC: 1.67 NG/DL (ref 0.93–1.7)
TSH SERPL DL<=0.05 MIU/L-ACNC: 4.25 UIU/ML (ref 0.27–4.2)

## 2024-03-01 RX ORDER — LEVOTHYROXINE SODIUM 125 MCG
125 TABLET ORAL DAILY
Qty: 90 TABLET | Refills: 3 | Status: SHIPPED | OUTPATIENT
Start: 2024-03-01 | End: 2025-03-01

## 2024-06-04 ENCOUNTER — TELEPHONE (OUTPATIENT)
Dept: ENDOCRINOLOGY | Facility: CLINIC | Age: 44
End: 2024-06-04
Payer: COMMERCIAL

## 2024-06-04 DIAGNOSIS — C73 FOLLICULAR THYROID CANCER: Primary | ICD-10-CM

## 2024-06-04 NOTE — TELEPHONE ENCOUNTER
Provider: [place]    Caller: RANDY FELICIANO    Relationship to Patient: SELF    Reason for Call: PATIENT WOULD LIKE HER LAB ORDER FOR HER TO GET HER 3 MONTH LAB WORK DONE. PATIENT SAID IF THIS HAS CHANGED THAT IS FINE, JUST LET HER KNOW. PLEASE ADVISE

## 2024-06-06 ENCOUNTER — LAB (OUTPATIENT)
Dept: LAB | Facility: HOSPITAL | Age: 44
End: 2024-06-06
Payer: COMMERCIAL

## 2024-06-06 DIAGNOSIS — C73 FOLLICULAR THYROID CANCER: ICD-10-CM

## 2024-06-06 PROCEDURE — 86800 THYROGLOBULIN ANTIBODY: CPT

## 2024-06-06 PROCEDURE — 36415 COLL VENOUS BLD VENIPUNCTURE: CPT

## 2024-06-06 PROCEDURE — 84432 ASSAY OF THYROGLOBULIN: CPT

## 2024-06-06 PROCEDURE — 84439 ASSAY OF FREE THYROXINE: CPT

## 2024-06-06 PROCEDURE — 84443 ASSAY THYROID STIM HORMONE: CPT

## 2024-06-07 LAB
T4 FREE SERPL-MCNC: 1.48 NG/DL (ref 0.92–1.68)
TSH SERPL DL<=0.05 MIU/L-ACNC: 1.63 UIU/ML (ref 0.27–4.2)

## 2024-06-10 LAB
THYROGLOB AB SERPL-ACNC: <1 IU/ML (ref 0–0.9)
THYROGLOB SERPL-MCNC: <0.1 NG/ML (ref 1.5–38.5)

## 2024-07-22 ENCOUNTER — HOSPITAL ENCOUNTER (OUTPATIENT)
Dept: MAMMOGRAPHY | Facility: HOSPITAL | Age: 44
Discharge: HOME | End: 2024-07-22
Payer: COMMERCIAL

## 2024-07-22 ENCOUNTER — HOSPITAL ENCOUNTER (OUTPATIENT)
Dept: ULTRASOUND IMAGING | Facility: HOSPITAL | Age: 44
Discharge: HOME | End: 2024-07-22
Payer: COMMERCIAL

## 2024-07-22 DIAGNOSIS — R92.8 ABNORMAL MAMMOGRAM: ICD-10-CM

## 2024-07-22 LAB
NCCN CRITERIA FLAG: NORMAL
TYRER CUZICK SCORE: 14.2

## 2024-07-22 PROCEDURE — 77062 BREAST TOMOSYNTHESIS BI: CPT | Performed by: RADIOLOGY

## 2024-07-22 PROCEDURE — G0279 TOMOSYNTHESIS, MAMMO: HCPCS

## 2024-07-22 PROCEDURE — 77066 DX MAMMO INCL CAD BI: CPT

## 2024-07-22 PROCEDURE — 76642 ULTRASOUND BREAST LIMITED: CPT | Performed by: RADIOLOGY

## 2024-07-22 PROCEDURE — 76642 ULTRASOUND BREAST LIMITED: CPT

## 2024-07-22 PROCEDURE — 77066 DX MAMMO INCL CAD BI: CPT | Performed by: RADIOLOGY

## 2024-08-26 ENCOUNTER — HOSPITAL ENCOUNTER (OUTPATIENT)
Dept: MRI IMAGING | Facility: HOSPITAL | Age: 44
Discharge: HOME | End: 2024-08-26
Admitting: OBSTETRICS & GYNECOLOGY
Payer: COMMERCIAL

## 2024-08-26 DIAGNOSIS — Z12.39 BREAST CANCER SCREENING, HIGH RISK PATIENT: ICD-10-CM

## 2024-08-26 PROCEDURE — 77049 MRI BREAST C-+ W/CAD BI: CPT

## 2024-08-26 PROCEDURE — 0 GADOBENATE DIMEGLUMINE 529 MG/ML SOLUTION: Performed by: OBSTETRICS & GYNECOLOGY

## 2024-08-26 PROCEDURE — A9577 INJ MULTIHANCE: HCPCS | Performed by: OBSTETRICS & GYNECOLOGY

## 2024-08-26 RX ADMIN — GADOBENATE DIMEGLUMINE 19 ML: 529 INJECTION, SOLUTION INTRAVENOUS at 15:49

## 2024-08-30 ENCOUNTER — PRIOR AUTHORIZATION (OUTPATIENT)
Dept: ENDOCRINOLOGY | Facility: CLINIC | Age: 44
End: 2024-08-30
Payer: COMMERCIAL

## 2024-08-30 ENCOUNTER — OFFICE VISIT (OUTPATIENT)
Dept: ORTHOPEDIC SURGERY | Facility: CLINIC | Age: 44
End: 2024-08-30
Payer: COMMERCIAL

## 2024-08-30 VITALS
DIASTOLIC BLOOD PRESSURE: 84 MMHG | BODY MASS INDEX: 43.84 KG/M2 | SYSTOLIC BLOOD PRESSURE: 130 MMHG | WEIGHT: 238.2 LBS | HEIGHT: 62 IN

## 2024-08-30 DIAGNOSIS — M25.512 LEFT SHOULDER PAIN, UNSPECIFIED CHRONICITY: Primary | ICD-10-CM

## 2024-08-30 DIAGNOSIS — M75.92 SUPRASPINATUS TENDINITIS, LEFT: ICD-10-CM

## 2024-08-30 DIAGNOSIS — R20.2 NUMBNESS AND TINGLING IN LEFT ARM: ICD-10-CM

## 2024-08-30 DIAGNOSIS — R20.0 NUMBNESS AND TINGLING IN LEFT ARM: ICD-10-CM

## 2024-08-30 RX ORDER — LIDOCAINE HYDROCHLORIDE 10 MG/ML
4 INJECTION, SOLUTION EPIDURAL; INFILTRATION; INTRACAUDAL; PERINEURAL
Status: COMPLETED | OUTPATIENT
Start: 2024-08-30 | End: 2024-08-30

## 2024-08-30 RX ORDER — BUPIVACAINE HYDROCHLORIDE 2.5 MG/ML
4 INJECTION, SOLUTION EPIDURAL; INFILTRATION; INTRACAUDAL
Status: COMPLETED | OUTPATIENT
Start: 2024-08-30 | End: 2024-08-30

## 2024-08-30 RX ORDER — MELOXICAM 15 MG/1
15 TABLET ORAL DAILY
Qty: 30 TABLET | Refills: 1 | Status: SHIPPED | OUTPATIENT
Start: 2024-08-30

## 2024-08-30 RX ORDER — TRIAMCINOLONE ACETONIDE 40 MG/ML
40 INJECTION, SUSPENSION INTRA-ARTICULAR; INTRAMUSCULAR
Status: COMPLETED | OUTPATIENT
Start: 2024-08-30 | End: 2024-08-30

## 2024-08-30 RX ADMIN — LIDOCAINE HYDROCHLORIDE 4 ML: 10 INJECTION, SOLUTION EPIDURAL; INFILTRATION; INTRACAUDAL; PERINEURAL at 08:39

## 2024-08-30 RX ADMIN — TRIAMCINOLONE ACETONIDE 40 MG: 40 INJECTION, SUSPENSION INTRA-ARTICULAR; INTRAMUSCULAR at 08:39

## 2024-08-30 RX ADMIN — BUPIVACAINE HYDROCHLORIDE 4 ML: 2.5 INJECTION, SOLUTION EPIDURAL; INFILTRATION; INTRACAUDAL at 08:39

## 2024-08-30 NOTE — PROGRESS NOTES
[facility] Note        Subjective     Pain and Initial Evaluation of the Left Shoulder      HPI    Roberta Casillas is a 44 y.o. female.  Left shoulder pain since October of last year.  Overuse injury.  She had oral steroids that helped.  She has been to physical therapy at [facility].  Cervical traction helps.  She has had numbness and tingling down the left arm.    Past Medical History:   Diagnosis Date    Acid reflux     Allergic     Ear infection     H/O total thyroidectomy 2021    follicular thyroid carcinoma.     Hx of radiation therapy 2021    radioactive idodine treatment for thyroid cancer    Neck strain 10/2023    Sinusitis     Strep pharyngitis     Stress fracture 10/2008      Past Surgical History:   Procedure Laterality Date    APPENDECTOMY      BREAST BIOPSY Bilateral 2022    bilateral excisional    BREAST BIOPSY Left 2023    US     SECTION      NECK SURGERY  2021    Thyroid      Family History   Problem Relation Age of Onset    Thyroid disease Mother     Hyperlipidemia Father     Heart disease Father     Cancer Father         Lymphoma    Thyroid disease Maternal Aunt     Diabetes Maternal Uncle     Colon cancer Maternal Uncle     Thyroid disease Maternal Grandmother     Breast cancer Neg Hx     Ovarian cancer Neg Hx      Social History     Socioeconomic History    Marital status:    Tobacco Use    Smoking status: Never     Passive exposure: Never    Smokeless tobacco: Never   Vaping Use    Vaping status: Never Used   Substance and Sexual Activity    Alcohol use: Never    Drug use: Never    Sexual activity: Yes     Partners: Male     Birth control/protection: Birth control pill, Pill      Current Outpatient Medications on File Prior to Visit   Medication Sig Dispense Refill    fluticasone (FLONASE) 50 MCG/ACT nasal spray 2 sprays into the nostril(s) as directed by provider Daily.      lansoprazole (PREVACID) 30 MG capsule Take 1 capsule  "by mouth Daily.      levocetirizine (XYZAL) 5 MG tablet Take 1 tablet by mouth Daily.      methocarbamol (ROBAXIN) 500 MG tablet   0    Norgestim-Eth Estrad Triphasic (ORTHO TRI-CYCLEN, 28, PO) Take  by mouth.      RA VITAMIN D-3 125 MCG (5000 UT) capsule Take 1 capsule by mouth Daily.  0    sertraline (ZOLOFT) 25 MG tablet Take 1 tablet by mouth Daily.      Synthroid 125 MCG tablet Take 1 tablet by mouth Daily. 90 tablet 3     No current facility-administered medications on file prior to visit.      Allergies   Allergen Reactions    Lidocaine Hives          Review of Systems   Constitutional: Negative.    HENT: Negative.     Eyes: Negative.    Respiratory: Negative.     Cardiovascular: Negative.    Gastrointestinal: Negative.    Endocrine: Negative.    Genitourinary: Negative.    Musculoskeletal:  Positive for arthralgias.   Skin: Negative.    Allergic/Immunologic: Negative.    Neurological: Negative.    Hematological: Negative.    Psychiatric/Behavioral: Negative.          I reviewed the patient's chief complaint, history of present illness, review of systems, past medical history, surgical history, family history, social history, medications and allergy list.        Objective      Physical Exam  /84   Ht 157.5 cm (62\")   Wt 108 kg (238 lb 3.2 oz)   BMI 43.57 kg/m²     Body mass index is 43.57 kg/m².    General  Mental Status - alert  General Appearance - cooperative, well groomed, not in acute distress  Orientation - Oriented X3  Build & Nutrition - well developed and well nourished  Posture - normal posture  Gait - normal gait       Ortho Exam  Left shoulder full motion full-strength with positive impingement.    Imaging/Studies Reviewed and Interpreted:  Imaging Results (Last 24 Hours)       Procedure Component Value Units Date/Time    XR Shoulder 2+ View Left [[id number]] Resulted: 08/30/24 0829     Updated: 08/30/24 0830    Narrative:      Left Shoulder X-Ray  Indication: Pain  AP, scapular Y, and " axillary lateral views    Findings:  No fracture  No bony lesion  Normal soft tissues  Normal joint spaces    No prior studies were available for comparison.          I viewed and personally interpreted MRI from June 28 which shows rotator cuff tendinopathy without a tear    Assessment    Assessment:  1. Left shoulder pain, unspecified chronicity    2. Numbness and tingling in left arm    3. Supraspinatus tendinitis, left        Plan:  Continue over-the-counter medication as needed for discomfort  I injected her left shoulder subacromial space with cortisone.  I discussed with the patient the potential benefits of performing a therapeutic injection of the cortisone as well as potential risks including but not limited to infection, swelling, pain, bleeding, bruising, nerve/vessel damage, skin color changes, transient elevation in blood glucose levels, and fat atrophy. After informed consent and verifying correct patient, procedure site, and type of procedure, the area was prepped with Hibiclens, ethyl chloride was used to numb the skin. The patient tolerated the procedure well. There were no complications.  I ordered prescription for Mobic.  She will continue home exercises and follow-up in 3 to 4 weeks.  If her neck numbness and tingling down the arm does not get better we will get an MRI of her C-spine.        Rubio Rodney MD  08/30/24  08:38 EDT      Dictated Utilizing Dragon Dictation.

## 2024-08-30 NOTE — TELEPHONE ENCOUNTER
PA started on CMM for Synthroid 125MCG tablets    RANDY FELICIANO (Key: [id number])  Need Help? Call us at [phone]  Outcome  Additional Information Required  DIOGO Goldberg has indicated that it is too soon to refill this medication at the pharmacy for your patient. If you need to renew an existing PA for your patient's medication, please reach out to [facility] directly at [phone]  Drug  Synthroid 125MCG tablets  [facility] cloud logo  Form  [employer] Electronic PA Form (2017 NCP[employer])    Phar notified

## 2024-08-30 NOTE — PROGRESS NOTES
Procedure   - Large Joint Arthrocentesis: L subacromial bursa on 8/30/2024 8:39 AM  Indications: pain  Details: 21 G needle, posterior approach  Medications: 4 mL lidocaine PF 1% 1 %; 4 mL bupivacaine (PF) 0.25 %; 40 mg triamcinolone acetonide 40 MG/ML  Outcome: tolerated well, no immediate complications  Procedure, treatment alternatives, risks and benefits explained, specific risks discussed. Consent was given by the patient. Immediately prior to procedure a time out was called to verify the correct patient, procedure, equipment, support staff and site/side marked as required. Patient was prepped and draped in the usual sterile fashion.

## 2024-09-11 ENCOUNTER — TELEPHONE (OUTPATIENT)
Dept: ENDOCRINOLOGY | Facility: CLINIC | Age: 44
End: 2024-09-11
Payer: COMMERCIAL

## 2024-09-11 DIAGNOSIS — E89.0 POSTOPERATIVE HYPOTHYROIDISM: Primary | ICD-10-CM

## 2024-09-11 NOTE — TELEPHONE ENCOUNTER
Caller: Roberta Casillas    Relationship: Self    Best call back number: [phone]    What orders are you requesting (i.e. lab or imaging): BLOODWORK    In what timeframe would the patient need to come in:     Where will you receive your lab/imaging services: [address], [place] [address]  PHONE:[phone]    Additional notes: WANTED TO MAKE SURE ALL THE LABS DR. ALONZO IS NEEDING FOR HER UPCOMING APPT  HAS EEN ADDED

## 2024-09-23 ENCOUNTER — LAB (OUTPATIENT)
Dept: LAB | Facility: HOSPITAL | Age: 44
End: 2024-09-23
Payer: COMMERCIAL

## 2024-09-23 DIAGNOSIS — E89.0 POSTOPERATIVE HYPOTHYROIDISM: ICD-10-CM

## 2024-09-23 PROCEDURE — 86800 THYROGLOBULIN ANTIBODY: CPT

## 2024-09-23 PROCEDURE — 36415 COLL VENOUS BLD VENIPUNCTURE: CPT

## 2024-09-23 PROCEDURE — 84439 ASSAY OF FREE THYROXINE: CPT

## 2024-09-23 PROCEDURE — 84432 ASSAY OF THYROGLOBULIN: CPT

## 2024-09-23 PROCEDURE — 84443 ASSAY THYROID STIM HORMONE: CPT

## 2024-09-24 LAB
T4 FREE SERPL-MCNC: 1.79 NG/DL (ref 0.92–1.68)
TSH SERPL DL<=0.05 MIU/L-ACNC: 0.84 UIU/ML (ref 0.27–4.2)

## 2024-09-25 LAB
THYROGLOB AB SERPL-ACNC: <1 IU/ML (ref 0–0.9)
THYROGLOB SERPL-MCNC: <0.1 NG/ML (ref 1.5–38.5)

## 2024-09-30 ENCOUNTER — OFFICE VISIT (OUTPATIENT)
Dept: ORTHOPEDIC SURGERY | Facility: CLINIC | Age: 44
End: 2024-09-30
Payer: COMMERCIAL

## 2024-09-30 VITALS
DIASTOLIC BLOOD PRESSURE: 80 MMHG | SYSTOLIC BLOOD PRESSURE: 128 MMHG | BODY MASS INDEX: 44.16 KG/M2 | HEIGHT: 62 IN | WEIGHT: 240 LBS

## 2024-09-30 DIAGNOSIS — M75.92 SUPRASPINATUS TENDINITIS, LEFT: Primary | ICD-10-CM

## 2024-09-30 PROCEDURE — 99213 OFFICE O/P EST LOW 20 MIN: CPT | Performed by: ORTHOPAEDIC SURGERY

## 2024-09-30 RX ORDER — NORGESTIMATE AND ETHINYL ESTRADIOL 0.25-0.035
1 KIT ORAL DAILY
COMMUNITY
Start: 2024-08-21

## 2024-09-30 NOTE — PROGRESS NOTES
"    [facility] [place] [facility] [place]        Subjective     CC: Follow-up (1 month follow up- left shoulder pain. )      HPI    Roberta Casillas is a 44 y.o. female.  She is doing well.  I injected her left shoulder subacromial space with cortisone August 30.  It helped tremendously.  She is no longer having numbness and tingling.  She feels back to normal    Overall, patient's symptoms are much improved    ROS:    Constiutional:Pt denies fever, chills, nausea, or vomiting.  MSK:as above        Objective      Past Medical History  Past Medical History:   Diagnosis Date    Acid reflux     Allergic     Ear infection     H/O total thyroidectomy 02/2021    follicular thyroid carcinoma.     Hx of radiation therapy 03/2021    radioactive idodine treatment for thyroid cancer    Neck strain 10/2023    Sinusitis     Strep pharyngitis     Stress fracture 10/2008     Social History     Socioeconomic History    Marital status:    Tobacco Use    Smoking status: Never     Passive exposure: Never    Smokeless tobacco: Never   Vaping Use    Vaping status: Never Used   Substance and Sexual Activity    Alcohol use: Never    Drug use: Never    Sexual activity: Yes     Partners: Male     Birth control/protection: Birth control pill, Pill          Physical Exam  /80   Ht 157.5 cm (62\")   Wt 109 kg (240 lb)   BMI 43.90 kg/m²     Body mass index is 43.9 kg/m².    Patient is well nourished and well developed.        Ortho Exam  Left shoulder full motion full-strength.  Negative impingement    Imaging/Labs/EMG Reviewed and Interpreted:  Imaging Results (Last 24 Hours)       ** No results found for the last 24 hours. **            I viewed and personally interpreted MRI from June 28 which shows rotator cuff tendinopathy without a tear       Assessment    Assessment:  1. Supraspinatus tendinitis, left        Plan:  Recommend over the counter anti-inflammatories for pain and/or swelling  She is doing great.  She will " follow-up as needed.  She may stop the anti-inflammatory      Rubio Rodney MD  09/30/24  15:21 EDT      Dictated Utilizing Dragon Dictation.

## 2024-10-25 ENCOUNTER — OFFICE VISIT (OUTPATIENT)
Dept: ENDOCRINOLOGY | Facility: CLINIC | Age: 44
End: 2024-10-25
Payer: COMMERCIAL

## 2024-10-25 VITALS
HEART RATE: 105 BPM | WEIGHT: 238 LBS | SYSTOLIC BLOOD PRESSURE: 128 MMHG | BODY MASS INDEX: 43.79 KG/M2 | DIASTOLIC BLOOD PRESSURE: 76 MMHG | HEIGHT: 62 IN

## 2024-10-25 DIAGNOSIS — C73 FOLLICULAR THYROID CANCER: Primary | ICD-10-CM

## 2024-10-25 DIAGNOSIS — E89.0 POSTOPERATIVE HYPOTHYROIDISM: ICD-10-CM

## 2024-10-25 PROCEDURE — 99214 OFFICE O/P EST MOD 30 MIN: CPT | Performed by: INTERNAL MEDICINE

## 2024-10-25 NOTE — PROGRESS NOTES
"Thyroid Cancer    Subjective   Roberta Casillas is a 44 y.o. female. 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 Patient is s/p adjuvant I-131 therapy, which she received on 03/25/2021 (100 mCi). TB uptake on the WBS was seen.   12/17/2021 patient underwent WBS with hypothyroid preparation - negative, Tg is 0.1  12/28/22 WBS repeated and negative, TG < 0.1     She presented for routine follow-up, had labs done recently. No complaints.     Medications:    Current Outpatient Medications:     Estarylla 0.25-35 MG-MCG per tablet, Take 1 tablet by mouth Daily., Disp: , Rfl:     fluticasone (FLONASE) 50 MCG/ACT nasal spray, Administer 2 sprays into the nostril(s) as directed by provider Daily., Disp: , Rfl:     lansoprazole (PREVACID) 30 MG capsule, Take 1 capsule by mouth Daily., Disp: , Rfl:     levocetirizine (XYZAL) 5 MG tablet, Take 1 tablet by mouth Daily., Disp: , Rfl:     meloxicam (Mobic) 15 MG tablet, Take 1 tablet by mouth Daily., Disp: 30 tablet, Rfl: 1    methocarbamol (ROBAXIN) 500 MG tablet, , Disp: , Rfl: 0    Norgestim-Eth Estrad Triphasic (ORTHO TRI-CYCLEN, 28, PO), Take  by mouth., Disp: , Rfl:     RA VITAMIN D-3 125 MCG (5000 UT) capsule, Take 1 capsule by mouth Daily., Disp: , Rfl: 0    sertraline (ZOLOFT) 25 MG tablet, Take 1 tablet by mouth Daily., Disp: , Rfl:     Synthroid 125 MCG tablet, Take 1 tablet by mouth Daily., Disp: 90 tablet, Rfl: 3      Review of Systems   Constitutional:  Positive for fatigue and unexpected weight gain.   Eyes:  Positive for itching.   Neurological:  Positive for weakness.   All other systems reviewed and are negative.      Objective   Blood pressure 128/76, pulse 105, height 157.5 cm (62.01\"), weight 108 kg (238 lb). Body mass index is 43.52 kg/m².  Physical Exam   Constitutional: She is oriented to " person, place, and time.   Neck:   Postop scar in good condition    Cardiovascular: Normal rate, regular rhythm, normal heart sounds and normal pulses.   Abdominal: Normal appearance.   Musculoskeletal: No swelling.   Lymphadenopathy:     She has no cervical adenopathy.   Neurological: She is alert and oriented to person, place, and time.     Post-operative Neck Ultrasound  [facility]    PT: Roberta Casillas  : 1980  Date:  12/15/23  Indication: History of thyroid cancer.    Technique: Real time high resolution imaging of the anterior neck was performed in longitudinal and transverse planes.    Findings:   Compartment / Level I  (Submandibular): no abnormal lymph nodules bilaterally  Compartment / Level II  (Suprahyoid parajugular): no abnormal lymph nodules bilaterally  Compartment / Level III  (Infrahyoid supracricoid parajugular): no abnormal lymph nodules bilaterally  Compartment / Level IV (Infracricoid parajugular): no abnormal lymph nodules bilaterally  Compartment / Level V (Posterior cervical triangle): no abnormal lymph nodules bilaterally  Compartment / Level VI (Central compartment): no abnormal lymph nodules identified  Compartment / Level VII (Substernal space): no abnormal lymph nodules bilaterally    Impression: The structures of the neck are shifted medially as expected post thyroidectomy. The thyroid bed is unremarkable.   No abnormal lymph nodes were identified in surgical anatomic compartments I - VII.      Recommendation: Repeat Neck US recommended as clinically indicated.      Signed: Sherita Rivera MD         Results for orders placed or performed in visit on 24   TSH    Collection Time: 24  3:28 PM    Specimen: Blood   Result Value Ref Range    TSH 0.839 0.270 - 4.200 uIU/mL   T4, Free    Collection Time: 24  3:28 PM    Specimen: Blood   Result Value Ref Range    Free T4 1.79 (H) 0.92 - 1.68 ng/dL   Thyroglobulin Antibody and Thyroglobulin, EBONY or  LC/MS-MS    Collection Time: 09/23/24  3:28 PM    Specimen: Blood   Result Value Ref Range    Thyroglobulin Ab <1.0 0.0 - 0.9 IU/mL   Thyroglobulin By EBONY    Collection Time: 09/23/24  3:28 PM    Specimen: Blood   Result Value Ref Range    THYROGLOBULIN BY EBONY <0.1 (L) 1.5 - 38.5 ng/mL           Assessment/Plan:    Problem List Items Addressed This Visit          Endocrine and Metabolic    Postoperative hypothyroidism       Hematology and Neoplasia    Follicular thyroid cancer - Primary    Overview     Follicular thyroid carcinoma - 1.8 cm with vascular invasion.   Date TSH Thyroglobulin WBS CT/PET scan ultrasound I-131 therapy Comments   3/25/2021  14.9 TB uptake   100 mCi    7/22/2021 0.2, high fT4 0.1     137-->125 mcg    12/21/2021  0.1 Negative       08/18/22 0.8 < 0.1        12/28/22 40 <0.1 (stimulated) negative                                                                 Follicular thyroid carcinoma. 1.8 cm with vascular invasion. S/p I-131 therapy 100 mCi in 3/25/2021, WBS showed thyroid  uptake only.   12/2021 WBS was negative and Stimulated Tg level is 0.1, favorable results. No evidence of recurrence.   12/28/22 - WBS is negative with Tg < 0.1. Thyrogen stimulated  Will plan for u/s in 2025, WBS in 2027.   I have ordered CT chest no contrast for initial evaluation    Synthroid  125 mcg . TFT showed high thyroid function. TG level is < 0.1  Follow-up in 10 -12 months

## 2024-11-13 ENCOUNTER — TRANSCRIBE ORDERS (OUTPATIENT)
Dept: ADMINISTRATIVE | Facility: HOSPITAL | Age: 44
End: 2024-11-13
Payer: COMMERCIAL

## 2024-11-13 ENCOUNTER — HOSPITAL ENCOUNTER (OUTPATIENT)
Dept: CT IMAGING | Facility: HOSPITAL | Age: 44
Discharge: HOME | End: 2024-11-13
Admitting: INTERNAL MEDICINE
Payer: COMMERCIAL

## 2024-11-13 DIAGNOSIS — C73 FOLLICULAR THYROID CANCER: ICD-10-CM

## 2024-11-13 DIAGNOSIS — Z12.31 BREAST CANCER SCREENING BY MAMMOGRAM: Primary | ICD-10-CM

## 2024-11-13 PROCEDURE — 71250 CT THORAX DX C-: CPT

## 2024-11-15 ENCOUNTER — PRIOR AUTHORIZATION (OUTPATIENT)
Dept: ENDOCRINOLOGY | Facility: CLINIC | Age: 44
End: 2024-11-15
Payer: COMMERCIAL

## 2024-11-18 NOTE — TELEPHONE ENCOUNTER
RANDY FELICIANO (Key: [id number])  PA Case ID #: [id number]  Need Help? Call us at [phone]  Outcome  Approved on November 15 by [employer]   Your PA request has been approved. Additional information will be provided in the approval communication. (Message 114)  Authorization Expiration Date: 2025  Drug  Synthroid 125MCG tablets  ePA cloud logo  Form  [employer] PA Form ( [employer])

## 2024-11-18 NOTE — TELEPHONE ENCOUNTER
As long as you remain covered by your prescription drug plan and there are no changes to your plan benefits, this request is approved from 11/15/2024 to 11/15/2025

## 2024-11-19 ENCOUNTER — TELEPHONE (OUTPATIENT)
Dept: ORTHOPEDIC SURGERY | Facility: CLINIC | Age: 44
End: 2024-11-19

## 2024-11-19 DIAGNOSIS — R20.0 NUMBNESS AND TINGLING IN LEFT ARM: Primary | ICD-10-CM

## 2024-11-19 DIAGNOSIS — R20.2 NUMBNESS AND TINGLING IN LEFT ARM: Primary | ICD-10-CM

## 2024-11-19 NOTE — TELEPHONE ENCOUNTER
Provider: DR. WILSON    Caller: Roberta Casillas    Relationship to Patient: Self    Phone Number: 11/19/24    Reason for Call: PATIENT CALLED STATING SHE WAS ADVISED DR. BASSETT WOULD SEE ABOUT ORDERING A MRI IN REGARDS TO A PINCHED NERVE IN THE NECK. SHE WANTS TO MOVE FORWARD WITH GETTING THAT DONE IF POSSIBLE. PLEASE ADVISE.

## 2024-12-02 ENCOUNTER — OFFICE VISIT (OUTPATIENT)
Dept: ORTHOPEDIC SURGERY | Facility: CLINIC | Age: 44
End: 2024-12-02
Payer: COMMERCIAL

## 2024-12-02 VITALS
BODY MASS INDEX: 43.79 KG/M2 | HEIGHT: 62 IN | WEIGHT: 238 LBS | SYSTOLIC BLOOD PRESSURE: 132 MMHG | DIASTOLIC BLOOD PRESSURE: 84 MMHG

## 2024-12-02 DIAGNOSIS — M75.92 SUPRASPINATUS TENDINITIS, LEFT: Primary | ICD-10-CM

## 2024-12-02 PROCEDURE — 20610 DRAIN/INJ JOINT/BURSA W/O US: CPT | Performed by: ORTHOPAEDIC SURGERY

## 2024-12-02 RX ORDER — LIDOCAINE HYDROCHLORIDE 10 MG/ML
4 INJECTION, SOLUTION EPIDURAL; INFILTRATION; INTRACAUDAL; PERINEURAL
Status: COMPLETED | OUTPATIENT
Start: 2024-12-02 | End: 2024-12-02

## 2024-12-02 RX ORDER — TRIAMCINOLONE ACETONIDE 40 MG/ML
40 INJECTION, SUSPENSION INTRA-ARTICULAR; INTRAMUSCULAR
Status: COMPLETED | OUTPATIENT
Start: 2024-12-02 | End: 2024-12-02

## 2024-12-02 RX ORDER — BUPIVACAINE HYDROCHLORIDE 2.5 MG/ML
4 INJECTION, SOLUTION EPIDURAL; INFILTRATION; INTRACAUDAL
Status: COMPLETED | OUTPATIENT
Start: 2024-12-02 | End: 2024-12-02

## 2024-12-02 RX ADMIN — LIDOCAINE HYDROCHLORIDE 4 ML: 10 INJECTION, SOLUTION EPIDURAL; INFILTRATION; INTRACAUDAL; PERINEURAL at 15:41

## 2024-12-02 RX ADMIN — BUPIVACAINE HYDROCHLORIDE 4 ML: 2.5 INJECTION, SOLUTION EPIDURAL; INFILTRATION; INTRACAUDAL at 15:41

## 2024-12-02 RX ADMIN — TRIAMCINOLONE ACETONIDE 40 MG: 40 INJECTION, SUSPENSION INTRA-ARTICULAR; INTRAMUSCULAR at 15:41

## 2024-12-02 NOTE — PROGRESS NOTES
Procedure   - Large Joint Arthrocentesis: L subacromial bursa on 12/2/2024 3:41 PM  Indications: pain  Details: 21 G needle, posterior approach  Medications: 4 mL bupivacaine (PF) 0.25 %; 4 mL lidocaine PF 1% 1 %; 40 mg triamcinolone acetonide 40 MG/ML  Outcome: tolerated well, no immediate complications  Procedure, treatment alternatives, risks and benefits explained, specific risks discussed. Consent was given by the patient. Immediately prior to procedure a time out was called to verify the correct patient, procedure, equipment, support staff and site/side marked as required. Patient was prepped and draped in the usual sterile fashion.

## 2024-12-02 NOTE — PROGRESS NOTES
"    [facility] Note        Subjective     CC: Follow-up (9 week f/u -- Supraspinatus tendinitis, left)      HPI    Roberta Casillas is a 44 y.o. female.  She is an established patient.  I last injected her shoulder August 30.  It lasted a couple months.  She would like another injection.    Overall, patient's symptoms are returning.    ROS:    Constiutional:Pt denies fever, chills, nausea, or vomiting.  MSK:as above        Objective      Past Medical History  Past Medical History:   Diagnosis Date    Acid reflux     Allergic     Ear infection     H/O total thyroidectomy 02/2021    follicular thyroid carcinoma.     Hx of radiation therapy 03/2021    radioactive idodine treatment for thyroid cancer    Neck strain 10/2023    Sinusitis     Strep pharyngitis     Stress fracture 10/2008     Social History     Socioeconomic History    Marital status:    Tobacco Use    Smoking status: Never     Passive exposure: Never    Smokeless tobacco: Never   Vaping Use    Vaping status: Never Used   Substance and Sexual Activity    Alcohol use: Never    Drug use: Never    Sexual activity: Yes     Partners: Male     Birth control/protection: Birth control pill, Pill          Physical Exam  /84   Ht 157.5 cm (62.01\")   Wt 108 kg (238 lb)   BMI 43.52 kg/m²     Body mass index is 43.52 kg/m².    Patient is well nourished and well developed.        Ortho Exam  Left shoulder full motion full-strength positive impingement    Imaging/Labs/EMG Reviewed and Interpreted:I viewed and personally interpreted MRI from June 28 which shows rotator cuff tendinopathy without a tear     Assessment    Assessment:  1. Supraspinatus tendinitis, left        Plan:  Recommend over the counter anti-inflammatories for pain and/or swelling  I injected her left shoulder subacromial space with cortisone.  I discussed with the patient the potential benefits of performing a therapeutic injection of the cortisone as well as " potential risks including but not limited to infection, swelling, pain, bleeding, bruising, nerve/vessel damage, skin color changes, transient elevation in blood glucose levels, and fat atrophy. After informed consent and verifying correct patient, procedure site, and type of procedure, the area was prepped with Hibiclens, ethyl chloride was used to numb the skin. The patient tolerated the procedure well. There were no complications.      Rubio Rodney MD  12/02/24  15:56 EST      Dictated Utilizing Dragon Dictation.

## 2024-12-03 ENCOUNTER — HOSPITAL ENCOUNTER (OUTPATIENT)
Facility: HOSPITAL | Age: 44
Discharge: HOME | End: 2024-12-03
Admitting: PHYSICIAN ASSISTANT
Payer: COMMERCIAL

## 2024-12-03 DIAGNOSIS — R20.2 NUMBNESS AND TINGLING IN LEFT ARM: ICD-10-CM

## 2024-12-03 DIAGNOSIS — R20.0 NUMBNESS AND TINGLING IN LEFT ARM: ICD-10-CM

## 2024-12-03 PROCEDURE — 72141 MRI NECK SPINE W/O DYE: CPT

## 2024-12-09 ENCOUNTER — TELEPHONE (OUTPATIENT)
Dept: ORTHOPEDIC SURGERY | Facility: CLINIC | Age: 44
End: 2024-12-09
Payer: COMMERCIAL

## 2024-12-09 DIAGNOSIS — R20.2 NUMBNESS AND TINGLING IN LEFT ARM: Primary | ICD-10-CM

## 2024-12-09 DIAGNOSIS — M54.12 CERVICAL RADICULOPATHY: ICD-10-CM

## 2024-12-09 DIAGNOSIS — R20.0 NUMBNESS AND TINGLING IN LEFT ARM: Primary | ICD-10-CM

## 2024-12-09 NOTE — TELEPHONE ENCOUNTER
Referral has been placed by Dr. Rodney for neurosurgery.    I have contacted Kirsten Edwards PA-C up in neurosurgery and she will be happy to see the patient.

## 2024-12-09 NOTE — TELEPHONE ENCOUNTER
Patient called to go over her MRI that was taken on 12/3/24 for her Cervical spine. She states that Dr. Rodney would go over the MRI with her.    Please advise.

## 2024-12-26 ENCOUNTER — OFFICE VISIT (OUTPATIENT)
Dept: NEUROSURGERY | Facility: CLINIC | Age: 44
End: 2024-12-26
Payer: COMMERCIAL

## 2024-12-26 VITALS
SYSTOLIC BLOOD PRESSURE: 120 MMHG | HEIGHT: 62 IN | BODY MASS INDEX: 44.57 KG/M2 | DIASTOLIC BLOOD PRESSURE: 76 MMHG | WEIGHT: 242.2 LBS

## 2024-12-26 DIAGNOSIS — G56.23 ULNAR NEUROPATHY OF BOTH UPPER EXTREMITIES: Primary | ICD-10-CM

## 2024-12-26 PROCEDURE — 99204 OFFICE O/P NEW MOD 45 MIN: CPT | Performed by: PHYSICIAN ASSISTANT

## 2024-12-26 RX ORDER — MECLIZINE HYDROCHLORIDE 25 MG/1
TABLET ORAL
COMMUNITY
Start: 2024-12-12

## 2024-12-26 NOTE — PROGRESS NOTES
Patient: Roberta Casillas  : 1980  Chart #: [id number]    Date of Service: 2024    CC: neck pain      Neck Pain   Associated symptoms include numbness and weakness.       HPI:  This 45 yo female presents with symptoms of neck pain, left arm pain radiating to the 4th and 5th digits of the left and right hand. She has been to Ortho and had injections into the left shoulder. She presents with a cervical MRI for review.    Chronic Illnesses:    Past Medical History:   Diagnosis Date    Acid reflux     Allergic     Cancer     Thyroid    Cervical disc disorder     Ear infection     H/O total thyroidectomy 2021    follicular thyroid carcinoma.     Hx of radiation therapy 2021    radioactive idodine treatment for thyroid cancer    Neck strain 10/2023    Sinusitis     Strep pharyngitis     Stress fracture 10/2008         Past Surgical History:   Procedure Laterality Date    APPENDECTOMY      BREAST BIOPSY Bilateral 2022    bilateral excisional    BREAST BIOPSY Left 2023    US     SECTION      THYROID SURGERY         Allergies   Allergen Reactions    Lidocaine Hives         Current Outpatient Medications:     Estarylla 0.25-35 MG-MCG per tablet, Take 1 tablet by mouth Daily., Disp: , Rfl:     fluticasone (FLONASE) 50 MCG/ACT nasal spray, Administer 2 sprays into the nostril(s) as directed by provider Daily., Disp: , Rfl:     lansoprazole (PREVACID) 30 MG capsule, Take 1 capsule by mouth Daily., Disp: , Rfl:     levocetirizine (XYZAL) 5 MG tablet, Take 1 tablet by mouth Daily., Disp: , Rfl:     meclizine (ANTIVERT) 25 MG tablet, TAKE 1 TABLET BY MOUTH EVERY 6 TO 8 HOURS AS NEEDED AS DIRECTED FOR DIZZINESS, Disp: , Rfl:     meloxicam (Mobic) 15 MG tablet, Take 1 tablet by mouth Daily., Disp: 30 tablet, Rfl: 1    methocarbamol (ROBAXIN) 500 MG tablet, , Disp: , Rfl: 0    Norgestim-Eth Estrad Triphasic (ORTHO TRI-CYCLEN, 28, PO), Take  by mouth., Disp: , Rfl:     RA VITAMIN D-3 125  MCG (5000 UT) capsule, Take 1 capsule by mouth Daily., Disp: , Rfl: 0    sertraline (ZOLOFT) 50 MG tablet, Take 1 tablet by mouth Daily., Disp: , Rfl:     Synthroid 125 MCG tablet, Take 1 tablet by mouth Daily., Disp: 90 tablet, Rfl: 3    Social History     Socioeconomic History    Marital status:    Tobacco Use    Smoking status: Never     Passive exposure: Never    Smokeless tobacco: Never   Vaping Use    Vaping status: Never Used   Substance and Sexual Activity    Alcohol use: Never    Drug use: Never    Sexual activity: Yes     Partners: Male     Birth control/protection: Birth control pill, Pill       Family History   Problem Relation Age of Onset    Thyroid disease Mother     Hyperlipidemia Father     Heart disease Father     Cancer Father         Lymphoma    Thyroid disease Maternal Aunt     Diabetes Maternal Uncle     Colon cancer Maternal Uncle     Thyroid disease Maternal Grandmother     Breast cancer Neg Hx     Ovarian cancer Neg Hx        Class 3 Severe Obesity (BMI >=40). Obesity-related health conditions include the following: osteoarthritis. Obesity is unchanged. BMI is is above average; BMI management plan is completed.     Social History    Tobacco Use      Smoking status: Never        Passive exposure: Never      Smokeless tobacco: Never       Review of Systems   Musculoskeletal:  Positive for neck pain.   Neurological:  Positive for dizziness, weakness and numbness.        Gait & Balance Assessment:  Risk assessment for falls. Fall precautions:  such as;   Using gait aids a cane, walker at the appropriate height at all times for ambulation or if necessary a wheelchair  Removing all area rugs and coffee tables to create a safe environment at home  Ensure clean, dry floors  Wearing supportive footwear and properly fitting clothing  Ensure bed/chair is appropriate height and patient's feet can touch the floor  Using a shower transfer bench  Using walk-in shower and having shower safety bars  "installed  Ensure proper lighting, minimize glare  Have nightlights operational and in use  Participation in an exercise program for gait training, balance training and strength  Avoid carrying laundry up and down steps  Ensure proper compliance and organization of medications to avoid errors   Avoid use of over the counter sedatives and alcohol consumption  Ensure easy access to call bell, glasses, TV control, telephone  Ensure glasses/hearing aids are in use or close by (on top of night table)     Physical examination:  Blood pressure 120/76, height 157.5 cm (62\"), weight 110 kg (242 lb 3.2 oz).  HEENT- normocephalic, atraumatic, sclera clear  Lungs-normal expansion, no wheezing  Heart-regular rate and rhythm  Extremities-positive pulses, no edema    Neurological Exam   WDWNWF  A/A/C, speech clear, attention normal, conversant, answers questions appropriately, good historian.  Cranial nerves II through XII are intact.  Motor examination does not reveal weakness in the , upper or lower extremities.   Sensation is intact.  Gait is normal, balance is normal.   No tremors are noted.  Reflexes are intact.   Comer is negative. Clonus is negative.   Palpation reveals mild discomfort to palpation of the cervical spine and into the occipital nerve.     Radiographic Imaging:  For my review is a cervical MRI that shows very mild left foraminal narrowing at C5-6.    Medical Decision Making  Diagnoses and all orders for this visit:    1. Ulnar neuropathy of both upper extremities (Primary)  -     EMG & Nerve Conduction Test; Future         Any copied data from previous notes included in the (1) HPI, (2) PE, (3) MDM and/or assessment and plan has been reviewed and is accurate as of this day.    Kirsten Edwards, OPAL    Patient Care Team:  Blossom Branch APRN as PCP - General (Nurse Practitioner)  Sherita Rivera MD as Consulting Physician (Endocrinology)  Sherry Edwards PA-C as Physician Assistant (Physician " Assistant)

## 2024-12-27 DIAGNOSIS — G56.23 ULNAR NEUROPATHY OF BOTH UPPER EXTREMITIES: Primary | ICD-10-CM

## 2025-01-09 RX ORDER — MELOXICAM 15 MG/1
15 TABLET ORAL DAILY
Qty: 30 TABLET | Refills: 1 | Status: SHIPPED | OUTPATIENT
Start: 2025-01-09

## 2025-01-10 ENCOUNTER — OFFICE VISIT (OUTPATIENT)
Dept: NEUROSURGERY | Facility: CLINIC | Age: 45
End: 2025-01-10
Payer: COMMERCIAL

## 2025-01-10 VITALS — HEIGHT: 62 IN | TEMPERATURE: 97.5 F | BODY MASS INDEX: 44.35 KG/M2 | WEIGHT: 241 LBS

## 2025-01-10 DIAGNOSIS — M54.2 NECK PAIN OF OVER 3 MONTHS DURATION: ICD-10-CM

## 2025-01-10 DIAGNOSIS — G56.23 ULNAR NEUROPATHY OF BOTH UPPER EXTREMITIES: Primary | ICD-10-CM

## 2025-01-10 DIAGNOSIS — M47.812 CERVICAL SPONDYLOSIS WITHOUT MYELOPATHY: ICD-10-CM

## 2025-01-10 PROCEDURE — 99214 OFFICE O/P EST MOD 30 MIN: CPT | Performed by: PHYSICIAN ASSISTANT

## 2025-01-10 RX ORDER — DOXYCYCLINE HYCLATE 50 MG/1
1 CAPSULE ORAL EVERY 12 HOURS SCHEDULED
COMMUNITY
Start: 2025-01-04

## 2025-01-10 NOTE — PROGRESS NOTES
Patient: Roberta Casillas  : 1980  Chart #: [id number]    Date of Service: 1/10/2025    CC: neck pain      Neck Pain   Associated symptoms include numbness and weakness.       HPI:  This 45 yo female presents with symptoms of neck pain, left arm pain radiating to the 4th and 5th digits of the left and right hand. She has been to Ortho and had injections into the left shoulder.  She underwent therapy for her left shoulder.  She endorses that she carries all of her stress in her neck continues with numbness and tingling in the 4th and 5th digits of both hands. She feels her left  is weak and she will occasionally drop something she has picked up.    Chronic Illnesses:  Past Medical History:   Diagnosis Date    Acid reflux     Allergic     Cancer     Thyroid    Cervical disc disorder     Ear infection     H/O total thyroidectomy 2021    follicular thyroid carcinoma.     Hx of radiation therapy 2021    radioactive idodine treatment for thyroid cancer    Neck strain 10/2023    Sinusitis     Strep pharyngitis     Stress fracture 10/2008         Past Surgical History:   Procedure Laterality Date    APPENDECTOMY      BREAST BIOPSY Bilateral 2022    bilateral excisional    BREAST BIOPSY Left 2023    US     SECTION      THYROID SURGERY         Allergies   Allergen Reactions    Lidocaine Hives         Current Outpatient Medications:     doxycycline (VIBRAMYCIN) 50 MG capsule, Take 1 capsule by mouth Every 12 (Twelve) Hours., Disp: , Rfl:     Estarylla 0.25-35 MG-MCG per tablet, Take 1 tablet by mouth Daily., Disp: , Rfl:     fluticasone (FLONASE) 50 MCG/ACT nasal spray, Administer 2 sprays into the nostril(s) as directed by provider Daily., Disp: , Rfl:     lansoprazole (PREVACID) 30 MG capsule, Take 1 capsule by mouth Daily., Disp: , Rfl:     levocetirizine (XYZAL) 5 MG tablet, Take 1 tablet by mouth Daily., Disp: , Rfl:     meclizine (ANTIVERT) 25 MG tablet, TAKE 1 TABLET BY  MOUTH EVERY 6 TO 8 HOURS AS NEEDED AS DIRECTED FOR DIZZINESS, Disp: , Rfl:     meloxicam (MOBIC) 15 MG tablet, TAKE 1 TABLET BY MOUTH DAILY, Disp: 30 tablet, Rfl: 1    methocarbamol (ROBAXIN) 500 MG tablet, , Disp: , Rfl: 0    Norgestim-Eth Estrad Triphasic (ORTHO TRI-CYCLEN, 28, PO), Take  by mouth., Disp: , Rfl:     RA VITAMIN D-3 125 MCG (5000 UT) capsule, Take 1 capsule by mouth Daily., Disp: , Rfl: 0    sertraline (ZOLOFT) 50 MG tablet, Take 1 tablet by mouth Daily., Disp: , Rfl:     Synthroid 125 MCG tablet, Take 1 tablet by mouth Daily., Disp: 90 tablet, Rfl: 3    Social History     Socioeconomic History    Marital status:    Tobacco Use    Smoking status: Never     Passive exposure: Never    Smokeless tobacco: Never   Vaping Use    Vaping status: Never Used   Substance and Sexual Activity    Alcohol use: Never    Drug use: Never    Sexual activity: Yes     Partners: Male     Birth control/protection: Birth control pill, Pill       Family History   Problem Relation Age of Onset    Thyroid disease Mother     Hyperlipidemia Father     Heart disease Father     Cancer Father         Lymphoma    Thyroid disease Maternal Aunt     Diabetes Maternal Uncle     Colon cancer Maternal Uncle     Thyroid disease Maternal Grandmother     Breast cancer Neg Hx     Ovarian cancer Neg Hx        Class 3 Severe Obesity (BMI >=40). Obesity-related health conditions include the following: osteoarthritis. Obesity is unchanged. BMI is is above average; BMI management plan is completed.     Social History    Tobacco Use      Smoking status: Never        Passive exposure: Never      Smokeless tobacco: Never       Review of Systems   Musculoskeletal:  Positive for neck pain.   Neurological:  Positive for dizziness, weakness and numbness.        Gait & Balance Assessment:  Risk assessment for falls. Fall precautions:  such as;   Using gait aids a cane, walker at the appropriate height at all times for ambulation or if necessary a  "wheelchair  Removing all area rugs and coffee tables to create a safe environment at home  Ensure clean, dry floors  Wearing supportive footwear and properly fitting clothing  Ensure bed/chair is appropriate height and patient's feet can touch the floor  Using a shower transfer bench  Using walk-in shower and having shower safety bars installed  Ensure proper lighting, minimize glare  Have nightlights operational and in use  Participation in an exercise program for gait training, balance training and strength  Avoid carrying laundry up and down steps  Ensure proper compliance and organization of medications to avoid errors   Avoid use of over the counter sedatives and alcohol consumption  Ensure easy access to call bell, glasses, TV control, telephone  Ensure glasses/hearing aids are in use or close by (on top of night table)     Physical examination:  Temperature 97.5 °F (36.4 °C), temperature source Infrared, height 157.5 cm (62\"), weight 109 kg (241 lb).  HEENT- normocephalic, atraumatic, sclera clear  Lungs-normal expansion, no wheezing  Heart-regular rate and rhythm  Extremities-positive pulses, no edema    Neurological Exam   WDWNWF  A/A/C, speech clear, attention normal, conversant, answers questions appropriately, good historian.  Cranial nerves II through XII are intact.  Motor examination does not reveal weakness in the , upper or lower extremities.   Sensation is intact.  Gait is normal, balance is normal.   No tremors are noted.  Reflexes are intact.   Comer is negative. Clonus is negative.   Palpation reveals mild discomfort to palpation of the cervical spine and into the occipital nerve.     Radiographic Imaging:  For my review is a cervical MRI that shows very mild left foraminal narrowing at C6-7        Medical Decision Making :  Diagnoses and all orders for this visit:    1. Ulnar neuropathy of both upper extremities (Primary)    2. Neck pain of over 3 months duration    3. Cervical " spondylosis without myelopathy    Her nerve studies came back showing mild right carpal tunnel syndrome there was no evidence of ulnar neuropathy.  She does have symptoms associated with bilateral ulnar neuropathy.  We have talked about conservative treatment with elbow sleeves while she is sleeping because she will typically wake up 3 times or more at night with numbness in her fourth and fifth digits.  She has poor sleep habits and may benefit from seeing a sleep therapist, she will talk to her PCP about this.  She also has noted that it is very hard for her to lose weight, she tells me she has joined weight [employer], for the last year and a half she has been walking 2 miles 5-6 times a week and no changes in her weight.  She may benefit from a dietitian consultation if she does not show improvement with weight watchers.  I would like to show her films to one of the partners regarding the mild changes on her cervical MRI and a negative nerve study but having symptoms that correlate with an ulnar neuropathy.  Will discuss further if she would benefit from a cervical epidural steroid injection.  I will give her a call next week.    Including assessment, review of prior documentation, review and interpretation of new diagnostic studies, discussing these findings with the patient and documentation, more than 30 minutes total time was spent on this appointment.    Any copied data from previous notes included in the (1) HPI, (2) PE, (3) MDM and/or assessment and plan has been reviewed and is accurate as of this day.    Kirsten Edwards, OPAL    Patient Care Team:  Blossom Branch APRN as PCP - General (Nurse Practitioner)  Sherita Rivera MD as Consulting Physician (Endocrinology)  Sherry Edwards PA-C as Physician Assistant (Physician Assistant)

## 2025-01-16 ENCOUNTER — DOCUMENTATION (OUTPATIENT)
Dept: NEUROSURGERY | Facility: CLINIC | Age: 45
End: 2025-01-16
Payer: COMMERCIAL

## 2025-01-16 NOTE — PROGRESS NOTES
Roberta Casillas  1980    I called Roberta and left her message regarding proceeding with a steroid injection in her cervical spine.  I have asked her to give us a call back in the office if she would like me to place that order.    Sherry Edwards PA-C

## 2025-01-20 DIAGNOSIS — M47.812 CERVICAL SPONDYLOSIS WITHOUT MYELOPATHY: Primary | ICD-10-CM

## 2025-02-03 ENCOUNTER — OFFICE VISIT (OUTPATIENT)
Dept: PAIN MEDICINE | Facility: CLINIC | Age: 45
End: 2025-02-03
Payer: COMMERCIAL

## 2025-02-03 VITALS — WEIGHT: 240 LBS | BODY MASS INDEX: 44.16 KG/M2 | HEIGHT: 62 IN

## 2025-02-03 DIAGNOSIS — M25.512 CHRONIC PAIN IN LEFT SHOULDER: ICD-10-CM

## 2025-02-03 DIAGNOSIS — G89.29 CHRONIC PAIN IN LEFT SHOULDER: ICD-10-CM

## 2025-02-03 DIAGNOSIS — M54.2 NECK PAIN: Primary | ICD-10-CM

## 2025-02-03 DIAGNOSIS — G56.23 ULNAR NEUROPATHY OF BOTH UPPER EXTREMITIES: ICD-10-CM

## 2025-02-03 DIAGNOSIS — M79.18: Primary | ICD-10-CM

## 2025-02-03 PROCEDURE — 99204 OFFICE O/P NEW MOD 45 MIN: CPT | Performed by: STUDENT IN AN ORGANIZED HEALTH CARE EDUCATION/TRAINING PROGRAM

## 2025-02-03 RX ORDER — NORTRIPTYLINE HYDROCHLORIDE 25 MG/1
25 CAPSULE ORAL NIGHTLY PRN
Qty: 60 CAPSULE | Refills: 1 | Status: SHIPPED | OUTPATIENT
Start: 2025-02-03

## 2025-02-03 RX ORDER — BACLOFEN 10 MG/1
TABLET ORAL
Qty: 90 TABLET | Refills: 0 | Status: SHIPPED | OUTPATIENT
Start: 2025-02-03

## 2025-02-03 NOTE — PROGRESS NOTES
Referring Physician: Sherry Edwards PA-C  [address] [facility]   [address]  [place] [id number]    Primary Physician: Blossom Branch APRN    CHIEF COMPLAINT or REASON FOR VISIT: Neck Pain (New patient)      Initial history of present illness on 02/03/2025:  Ms. Roberta Casillas is 44 y.o. female who presents as a new patient referral for for evaluation and treatment of right shoulder pain, left shoulder pain, bilateral hand numbness and tingling.  Patient reports a multiyear history of bilateral hand numbness and tingling in her fourth and fifth digits.  This is worse at night because she sleeps with a flexed elbow.  She has been evaluated by orthopedics and neurosurgery for this issue.  She has had a left shoulder injection which did help her left shoulder.  She complains of right-sided neck pain radiating from the right occiput to the superior medial angle of the scapula.  She denies any weakness or clumsiness of gait.  She does complain of some dropping of items out of her hands.  Symptoms in the hands are worst at night.  She has tried methocarbamol and meloxicam which was very helpful but she would like to minimize NSAIDs.  She has done physical therapy for her left shoulder but not for her right neck.  She was evaluated by Kirsten Edwards PA-C who referred for consideration of epidural steroid injection.    Interval history:    Interventions:      Objective Pain Scoring:   BRIEF PAIN INVENTORY:  Total score:   Pain Score    02/03/25 0754   PainSc:   5   PainLoc: Neck      PHQ-2: 0  PHQ-9:    Opioid Risk Tool:         Review of Systems:   ROS negative except as otherwise noted     Past Medical History:   Past Medical History:   Diagnosis Date    Acid reflux     Allergic     Cancer 12/20    Thyroid    Cervical disc disorder     Ear infection     H/O total thyroidectomy 02/2021    follicular thyroid carcinoma.     Headache, tension-type 5/2013    Hx of radiation therapy 03/2021    radioactive idodine  treatment for thyroid cancer    Migraine 2014    Neck pain     Neck strain 10/2023    Sinusitis     Strep pharyngitis     Stress fracture 10/2008    TMJ dysfunction          Past Surgical History:   Past Surgical History:   Procedure Laterality Date    APPENDECTOMY      BREAST BIOPSY Bilateral 2022    bilateral excisional    BREAST BIOPSY Left 2023         SECTION      NECK SURGERY  -    Thyroid    THYROID SURGERY           Family History   Family History   Problem Relation Age of Onset    Thyroid disease Mother     Hyperlipidemia Father     Heart disease Father     Cancer Father         Lymphoma    Thyroid disease Maternal Aunt     Diabetes Maternal Uncle     Colon cancer Maternal Uncle     Thyroid disease Maternal Grandmother     Breast cancer Neg Hx     Ovarian cancer Neg Hx          Social History   Social History     Socioeconomic History    Marital status:    Tobacco Use    Smoking status: Never     Passive exposure: Never    Smokeless tobacco: Never   Vaping Use    Vaping status: Never Used   Substance and Sexual Activity    Alcohol use: Never    Drug use: Never    Sexual activity: Yes     Partners: Male     Birth control/protection: Birth control pill        Medications:     Current Outpatient Medications:     Estarylla 0.25-35 MG-MCG per tablet, Take 1 tablet by mouth Daily., Disp: , Rfl:     fluticasone (FLONASE) 50 MCG/ACT nasal spray, Administer 2 sprays into the nostril(s) as directed by provider Daily., Disp: , Rfl:     lansoprazole (PREVACID) 30 MG capsule, Take 1 capsule by mouth Daily., Disp: , Rfl:     levocetirizine (XYZAL) 5 MG tablet, Take 1 tablet by mouth Daily., Disp: , Rfl:     meclizine (ANTIVERT) 25 MG tablet, TAKE 1 TABLET BY MOUTH EVERY 6 TO 8 HOURS AS NEEDED AS DIRECTED FOR DIZZINESS, Disp: , Rfl:     meloxicam (MOBIC) 15 MG tablet, TAKE 1 TABLET BY MOUTH DAILY, Disp: 30 tablet, Rfl: 1    methocarbamol (ROBAXIN) 500 MG tablet, , Disp: , Rfl: 0     "Norgestim-Eth Estrad Triphasic (ORTHO TRI-CYCLEN, 28, PO), Take  by mouth., Disp: , Rfl:     RA VITAMIN D-3 125 MCG (5000 UT) capsule, Take 1 capsule by mouth Daily., Disp: , Rfl: 0    sertraline (ZOLOFT) 50 MG tablet, Take 1 tablet by mouth Daily., Disp: , Rfl:     Synthroid 125 MCG tablet, Take 1 tablet by mouth Daily., Disp: 90 tablet, Rfl: 3    doxycycline (VIBRAMYCIN) 50 MG capsule, Take 1 capsule by mouth Every 12 (Twelve) Hours., Disp: , Rfl:         Physical Exam:     Vitals:    25 0754   Weight: 109 kg (240 lb)   Height: 157.5 cm (62\")   PainSc:   5   PainLoc: Neck        General: Alert and oriented, No acute distress.   HEENT: Normocephalic, atraumatic.   Cardiovascular: No gross edema  Respiratory: Respirations are non-labored    Cervical Spine:   No masses or atrophy  Range of motion - Flexion normal. Extension normal. Lateral rotation normal.   Palpation -TTP right occiput  Spurling's - negative     Thoracic Spine:   Inspection: no gross abnormality  Paraspinal muscle palpation: TTP right levator scapula, trapezius  Spinous process palpation: nontender       Motor Exam:    Strength: Rate on 1-5 scale Right Left    C5-Elbow flexion, Deltoid 5/5  5/5    C6-Wrist extension 5/5  5/5    C7- Elbow / finger extension 5/5  5/5    C8- Finger flexion 5/5  5/5    T1- Intrinsics hand 5/5  5/5       Sensory Exam: Altered sensation bilateral ulnar nerve distribution       Neurologic: Cranial Nerves II-XII are grossly intact.   Comer’s -negative bilaterally      Psychiatric: Cooperative.   Gait: Normal   Assistive Devices: None    Imaging Studies:   No results found for this or any previous visit.        Independent review of radiographic imaging:     Impression & Plan:       2025: Roberta Casillas is a 44 y.o. female with past medical history significant for follicular thyroid cancer status post thyroidectomy, GERD, migraines,  who presents to the pain clinic for evaluation and treatment of " chronic right neck pain, chronic left shoulder pain, chronic bilateral hand numbness and tingling.  Cervical MRI without high-grade stenosis.  Evaluation consistent with right levator scapula syndrome, intrinsic left shoulder pathology, bilateral ulnar neuropathy.  I will refer to physical therapy with dry needling for her right levator scapula syndrome.  Additionally will prescribe baclofen, nortriptyline.  Can consider cervical epidural steroid injection if increasing suspicion for cervical radiculopathy.    1. Right-sided levator scapulae syndrome    2. Ulnar neuropathy of both upper extremities    3. Chronic pain in left shoulder        PLAN:  1. Medication Recommendations: Recommend Voltaren topical, NSAIDs, Tylenol.  Can trial turmeric 500 mg twice daily if NSAID contraindicated.  -Start baclofen  -Start nortriptyline nightly    2. Physical Therapy: Referral given today for dry needling    3. Psychological: defer    4. Complementary and alternative (CAM) Therapies:     5. Labs/Diagnostic studies: None indicated     6. Imaging: Cervical MRI reviewed    7. Interventions: Consider SHIKHA.    8. Referrals: PT    9. Records: Ortho, neurosurgery notes reviewed    10. Lifestyle goals:    Follow-up 1 month      [facility]  Shahbaz Marshall MD          Quality Metrics:

## 2025-03-03 ENCOUNTER — OFFICE VISIT (OUTPATIENT)
Dept: PAIN MEDICINE | Facility: CLINIC | Age: 45
End: 2025-03-03
Payer: COMMERCIAL

## 2025-03-03 VITALS — HEIGHT: 62 IN | BODY MASS INDEX: 43.61 KG/M2 | WEIGHT: 237 LBS

## 2025-03-03 DIAGNOSIS — G56.23 ULNAR NEUROPATHY OF BOTH UPPER EXTREMITIES: ICD-10-CM

## 2025-03-03 DIAGNOSIS — M54.2 NECK PAIN: Primary | ICD-10-CM

## 2025-03-03 DIAGNOSIS — M79.18: ICD-10-CM

## 2025-03-03 DIAGNOSIS — G89.29 CHRONIC PAIN IN LEFT SHOULDER: ICD-10-CM

## 2025-03-03 DIAGNOSIS — M25.512 CHRONIC PAIN IN LEFT SHOULDER: ICD-10-CM

## 2025-03-03 PROCEDURE — 99212 OFFICE O/P EST SF 10 MIN: CPT

## 2025-03-03 NOTE — PROGRESS NOTES
Referring Physician: No referring provider defined for this encounter.    Primary Physician: Blossom Branch APRN    CHIEF COMPLAINT or REASON FOR VISIT: Follow-up, Neck Pain, and Shoulder Pain (Right sided. )      Initial history of present illness on 02/03/2025:  Ms. Roberta Casillas is 44 y.o. female who presents as a new patient referral for for evaluation and treatment of right shoulder pain, left shoulder pain, bilateral hand numbness and tingling.  Patient reports a multiyear history of bilateral hand numbness and tingling in her fourth and fifth digits.  This is worse at night because she sleeps with a flexed elbow.  She has been evaluated by orthopedics and neurosurgery for this issue.  She has had a left shoulder injection which did help her left shoulder.  She complains of right-sided neck pain radiating from the right occiput to the superior medial angle of the scapula.  She denies any weakness or clumsiness of gait.  She does complain of some dropping of items out of her hands.  Symptoms in the hands are worst at night.  She has tried methocarbamol and meloxicam which was very helpful but she would like to minimize NSAIDs.  She has done physical therapy for her left shoulder but not for her right neck.  She was evaluated by Kirsten Edwards PA-C who referred for consideration of epidural steroid injection.    Interval history:  Patient returns to clinic today after undergoing conservative measures for chronic right shoulder pain, chronic left shoulder pain, and bilateral hand numbness and tingling.  She was started on nortriptyline and baclofen at her last office visit.  She reports good relief from conservative measures.  She has noticed a reduction in her chronic right shoulder pain and the bilateral hand numbness and tingling.  She is sleeping better at night.  She is tolerating these medications without side effect.  No new issues or complaints.    Interventions:      Objective Pain Scoring:   BRIEF  PAIN INVENTORY:  Total score:   Pain Score    25 0752   PainSc: 2    PainLoc: Neck        PHQ-2: 0  PHQ-9:    Opioid Risk Tool:         Review of Systems:   ROS negative except as otherwise noted     Past Medical History:   Past Medical History:   Diagnosis Date    Acid reflux     Allergic     Cancer     Thyroid    Cervical disc disorder     Ear infection     H/O total thyroidectomy 2021    follicular thyroid carcinoma.     Headache, tension-type 2013    Hx of radiation therapy 2021    radioactive idodine treatment for thyroid cancer    Migraine 2014    Neck pain     Neck strain 10/2023    Sinusitis     Strep pharyngitis     Stress fracture 10/2008    TMJ dysfunction          Past Surgical History:   Past Surgical History:   Procedure Laterality Date    APPENDECTOMY      BREAST BIOPSY Bilateral 2022    bilateral excisional    BREAST BIOPSY Left 2023         SECTION      EPIDURAL BLOCK      NECK SURGERY      Thyroid    THYROID SURGERY           Family History   Family History   Problem Relation Age of Onset    Thyroid disease Mother     Hyperlipidemia Father     Heart disease Father     Cancer Father         Lymphoma    Thyroid disease Maternal Aunt     Diabetes Maternal Uncle     Colon cancer Maternal Uncle     Thyroid disease Maternal Grandmother     Breast cancer Neg Hx     Ovarian cancer Neg Hx          Social History   Social History     Socioeconomic History    Marital status:    Tobacco Use    Smoking status: Never     Passive exposure: Never    Smokeless tobacco: Never   Vaping Use    Vaping status: Never Used   Substance and Sexual Activity    Alcohol use: Never    Drug use: Never    Sexual activity: Yes     Partners: Male     Birth control/protection: Birth control pill        Medications:     Current Outpatient Medications:     baclofen (LIORESAL) 10 MG tablet, Take 1-2 tabs per day as needed for muscle spasm., Disp: 90 tablet, Rfl: 0     "Estarylla 0.25-35 MG-MCG per tablet, Take 1 tablet by mouth Daily., Disp: , Rfl:     fluticasone (FLONASE) 50 MCG/ACT nasal spray, Administer 2 sprays into the nostril(s) as directed by provider Daily., Disp: , Rfl:     lansoprazole (PREVACID) 30 MG capsule, Take 1 capsule by mouth Daily., Disp: , Rfl:     levocetirizine (XYZAL) 5 MG tablet, Take 1 tablet by mouth Daily., Disp: , Rfl:     meclizine (ANTIVERT) 25 MG tablet, TAKE 1 TABLET BY MOUTH EVERY 6 TO 8 HOURS AS NEEDED AS DIRECTED FOR DIZZINESS, Disp: , Rfl:     meloxicam (MOBIC) 15 MG tablet, TAKE 1 TABLET BY MOUTH DAILY, Disp: 30 tablet, Rfl: 1    methocarbamol (ROBAXIN) 500 MG tablet, , Disp: , Rfl: 0    Norgestim-Eth Estrad Triphasic (ORTHO TRI-CYCLEN, 28, PO), Take  by mouth., Disp: , Rfl:     nortriptyline (PAMELOR) 25 MG capsule, Take 1 capsule by mouth At Night As Needed for Sleep. 1-2 TABLETS AT BEDTIME, Disp: 60 capsule, Rfl: 1    RA VITAMIN D-3 125 MCG (5000 UT) capsule, Take 1 capsule by mouth Daily., Disp: , Rfl: 0    sertraline (ZOLOFT) 50 MG tablet, Take 1 tablet by mouth Daily., Disp: , Rfl:     Synthroid 125 MCG tablet, Take 1 tablet by mouth Daily., Disp: 90 tablet, Rfl: 3        Physical Exam:     Vitals:    03/03/25 0752   Weight: 108 kg (237 lb)   Height: 157.5 cm (62.01\")   PainSc: 2    PainLoc: Neck          General: Alert and oriented, No acute distress.   HEENT: Normocephalic, atraumatic.   Cardiovascular: No gross edema  Respiratory: Respirations are non-labored    Cervical Spine:   No masses or atrophy  Range of motion - Flexion normal. Extension normal. Lateral rotation normal.   Palpation -TTP right occiput  Spurling's - negative     Thoracic Spine:   Inspection: no gross abnormality  Paraspinal muscle palpation: TTP right levator scapula, trapezius  Spinous process palpation: nontender       Motor Exam:    Strength: Rate on 1-5 scale Right Left    C5-Elbow flexion, Deltoid 5/5  5/5    C6-Wrist extension 5/5 5/5    C7- Elbow / " finger extension 5/5  5/5    C8- Finger flexion 5/5  5/5    T1- Intrinsics hand 5/5  5/5       Sensory Exam: Altered sensation bilateral ulnar nerve distribution       Neurologic: Cranial Nerves II-XII are grossly intact.   Comer’s -negative bilaterally      Psychiatric: Cooperative.   Gait: Normal   Assistive Devices: None    Imaging Studies:   No results found for this or any previous visit.        Independent review of radiographic imaging:     Impression & Plan:       2025: Roberta Casillas is a 44 y.o. female with past medical history significant for follicular thyroid cancer status post thyroidectomy, GERD, migraines,  who presents to the pain clinic for evaluation and treatment of chronic right neck pain, chronic left shoulder pain, chronic bilateral hand numbness and tingling.  Cervical MRI without high-grade stenosis.  Evaluation consistent with right levator scapula syndrome, intrinsic left shoulder pathology, bilateral ulnar neuropathy.  I will refer to physical therapy with dry needling for her right levator scapula syndrome.  Additionally will prescribe baclofen, nortriptyline.  Can consider cervical epidural steroid injection if increasing suspicion for cervical radiculopathy.  3/3/2025: Excellent relief from conservative measures.  Continue nortriptyline, baclofen, PT..  Tolerating medication without side effects.    1. Neck pain    2. Right-sided levator scapulae syndrome    3. Ulnar neuropathy of both upper extremities    4. Chronic pain in left shoulder          PLAN:  1. Medication Recommendations: Recommend Voltaren topical, NSAIDs, Tylenol.  Can trial turmeric 500 mg twice daily if NSAID contraindicated.  -Continue baclofen  -Continue nortriptyline nightly    2. Physical Therapy: Continue PT with dry needling    3. Psychological: defer    4. Complementary and alternative (CAM) Therapies:     5. Labs/Diagnostic studies: None indicated     6. Imagin. Interventions:  Consider SHIKHA if increasing suspicion for cervical radiculopathy; symptoms are well-controlled at the moment and more consistent with levator scapulae syndrome, ulnar neuropathy.    8. Referrals:     9. Records: Neurosurgery notes reviewed    10. Lifestyle goals:    Follow-up 4 months      [facility]  Jenn Gonzales PA-C          Quality Metrics:

## 2025-03-10 RX ORDER — LEVOTHYROXINE SODIUM 125 MCG
125 TABLET ORAL DAILY
Qty: 90 TABLET | Refills: 3 | Status: SHIPPED | OUTPATIENT
Start: 2025-03-10 | End: 2026-03-10

## 2025-03-10 NOTE — TELEPHONE ENCOUNTER
Rx Refill Note  Requested Prescriptions     Pending Prescriptions Disp Refills    Synthroid 125 MCG tablet [Pharmacy Med Name: SYNTHROID 0.125MG (125MCG) TABLETS] 90 tablet 3     Sig: TAKE 1 TABLET BY MOUTH DAILY      Last office visit with prescribing clinician: 10/25/2024   Last telemedicine visit with prescribing clinician: Visit date not found   Next office visit with prescribing clinician: 7/30/2025                         Would you like a call back once the refill request has been completed: [] Yes [] No    If the office needs to give you a call back, can they leave a voicemail: [] Yes [] No    Bret Espinal MA  03/10/25, 09:27 EDT

## 2025-04-18 RX ORDER — NORTRIPTYLINE HYDROCHLORIDE 25 MG/1
CAPSULE ORAL
Qty: 60 CAPSULE | Refills: 1 | Status: SHIPPED | OUTPATIENT
Start: 2025-04-18

## 2025-05-27 ENCOUNTER — TELEPHONE (OUTPATIENT)
Dept: PAIN MEDICINE | Facility: CLINIC | Age: 45
End: 2025-05-27
Payer: COMMERCIAL

## 2025-05-27 NOTE — TELEPHONE ENCOUNTER
HUB RELAY    LVM. Provider moving to [place] location on Tue/ Thurs. Please reschedule at either location.

## 2025-05-29 NOTE — TELEPHONE ENCOUNTER
Left VM for patient to call the office regarding r/s 7/3/2025 OV with Ghulam Prater.    RELAY    HUB please r/s 7/3/2025 OV to a date and location of the patient's choice.

## 2025-07-03 ENCOUNTER — OFFICE VISIT (OUTPATIENT)
Age: 45
End: 2025-07-03
Payer: COMMERCIAL

## 2025-07-03 VITALS — WEIGHT: 236 LBS | BODY MASS INDEX: 43.43 KG/M2 | HEIGHT: 62 IN

## 2025-07-03 DIAGNOSIS — M54.2 NECK PAIN: Primary | ICD-10-CM

## 2025-07-03 DIAGNOSIS — M79.18: ICD-10-CM

## 2025-07-03 DIAGNOSIS — G56.23 ULNAR NEUROPATHY OF BOTH UPPER EXTREMITIES: ICD-10-CM

## 2025-07-03 DIAGNOSIS — M79.18 CERVICAL MYOFASCIAL PAIN SYNDROME: ICD-10-CM

## 2025-07-03 DIAGNOSIS — M25.512 CHRONIC PAIN IN LEFT SHOULDER: ICD-10-CM

## 2025-07-03 DIAGNOSIS — M79.18 MYOFASCIAL PAIN SYNDROME OF THORACIC SPINE: ICD-10-CM

## 2025-07-03 DIAGNOSIS — G89.29 CHRONIC PAIN IN LEFT SHOULDER: ICD-10-CM

## 2025-07-03 PROCEDURE — 99213 OFFICE O/P EST LOW 20 MIN: CPT

## 2025-07-03 RX ORDER — NORTRIPTYLINE HYDROCHLORIDE 25 MG/1
25 CAPSULE ORAL NIGHTLY PRN
Qty: 60 CAPSULE | Refills: 1 | Status: SHIPPED | OUTPATIENT
Start: 2025-07-03

## 2025-07-03 NOTE — PROGRESS NOTES
Referring Physician: No referring provider defined for this encounter.    Primary Physician: Blossom Branch APRN    CHIEF COMPLAINT or REASON FOR VISIT: Follow-up and Neck Pain      Initial history of present illness on 02/03/2025:  Ms. Roberta Casillas is 45 y.o. female who presents as a new patient referral for for evaluation and treatment of right shoulder pain, left shoulder pain, bilateral hand numbness and tingling.  Patient reports a multiyear history of bilateral hand numbness and tingling in her fourth and fifth digits.  This is worse at night because she sleeps with a flexed elbow.  She has been evaluated by orthopedics and neurosurgery for this issue.  She has had a left shoulder injection which did help her left shoulder.  She complains of right-sided neck pain radiating from the right occiput to the superior medial angle of the scapula.  She denies any weakness or clumsiness of gait.  She does complain of some dropping of items out of her hands.  Symptoms in the hands are worst at night.  She has tried methocarbamol and meloxicam which was very helpful but she would like to minimize NSAIDs.  She has done physical therapy for her left shoulder but not for her right neck.  She was evaluated by Kirsten Edwards PA-C who referred for consideration of epidural steroid injection.    Interval history:  Patient returns to clinic today.  She continues to take nortriptyline which provides excellent relief of bilateral upper extremity numbness and tingling.  She continues to complain of chronic neck pain.  She has completed physical therapy with dry needling which was extremely beneficial however  relief was not long-lasting.  She continues to take baclofen with some relief.  She has interest in additional strategies to help alleviate her pain.  She does have an allergy to lidocaine      Interventions:      Objective Pain Scoring:   BRIEF PAIN INVENTORY:  Total score:   Pain Score    07/03/25 1331   PainSc: 6     PainLoc: Neck          PHQ-2: 0  PHQ-9:    Opioid Risk Tool:         Review of Systems:   ROS negative except as otherwise noted     Past Medical History:   Past Medical History:   Diagnosis Date    Acid reflux     Allergic     Cancer     Thyroid    Cervical disc disorder     Ear infection     H/O total thyroidectomy 2021    follicular thyroid carcinoma.     Headache, tension-type 2013    Hx of radiation therapy 2021    radioactive idodine treatment for thyroid cancer    Hypoglycemia     Hypothyroidism 2020    Migraine 2014    Neck pain     Neck strain 10/2023    Sinusitis     Strep pharyngitis     Stress fracture 10/2008    Thyroid cancer     Thyroid nodule     TMJ dysfunction     Vitamin D deficiency ?         Past Surgical History:   Past Surgical History:   Procedure Laterality Date    APPENDECTOMY      BREAST BIOPSY Bilateral 2022    bilateral excisional    BREAST BIOPSY Left 2023         SECTION      EPIDURAL BLOCK      NECK SURGERY      Thyroid    THYROID SURGERY           Family History   Family History   Problem Relation Age of Onset    Thyroid disease Mother     Hyperlipidemia Father     Heart disease Father     Cancer Father         Lymphoma    Thyroid disease Maternal Aunt     Diabetes Maternal Uncle     Colon cancer Maternal Uncle     Thyroid disease Maternal Grandmother     Breast cancer Neg Hx     Ovarian cancer Neg Hx          Social History   Social History     Socioeconomic History    Marital status:    Tobacco Use    Smoking status: Never     Passive exposure: Never    Smokeless tobacco: Never   Vaping Use    Vaping status: Never Used   Substance and Sexual Activity    Alcohol use: Never    Drug use: Never    Sexual activity: Yes     Partners: Male     Birth control/protection: Birth control pill        Medications:     Current Outpatient Medications:     baclofen (LIORESAL) 10 MG tablet, Take 1-2 tabs per day as needed for  "muscle spasm., Disp: 90 tablet, Rfl: 0    fluticasone (FLONASE) 50 MCG/ACT nasal spray, Administer 2 sprays into the nostril(s) as directed by provider Daily., Disp: , Rfl:     lansoprazole (PREVACID) 30 MG capsule, Take 1 capsule by mouth Daily., Disp: , Rfl:     levocetirizine (XYZAL) 5 MG tablet, Take 1 tablet by mouth Daily., Disp: , Rfl:     meclizine (ANTIVERT) 25 MG tablet, TAKE 1 TABLET BY MOUTH EVERY 6 TO 8 HOURS AS NEEDED AS DIRECTED FOR DIZZINESS, Disp: , Rfl:     meloxicam (MOBIC) 15 MG tablet, TAKE 1 TABLET BY MOUTH DAILY (Patient taking differently: Take 1 tablet by mouth As Needed.), Disp: 30 tablet, Rfl: 1    methocarbamol (ROBAXIN) 500 MG tablet, Take 1 tablet by mouth As Needed., Disp: , Rfl: 0    Norgestim-Eth Estrad Triphasic (ORTHO TRI-CYCLEN, 28, PO), Take  by mouth., Disp: , Rfl:     nortriptyline (PAMELOR) 25 MG capsule, TAKE 1-2 CAPSULES BY MOUTH EVERY NIGHT AT BEDTIME AS NEEDED FOR SLEEP, Disp: 60 capsule, Rfl: 1    RA VITAMIN D-3 125 MCG (5000 UT) capsule, Take 1 capsule by mouth Daily., Disp: , Rfl: 0    sertraline (ZOLOFT) 50 MG tablet, Take 1 tablet by mouth Daily., Disp: , Rfl:     Synthroid 125 MCG tablet, TAKE 1 TABLET BY MOUTH DAILY, Disp: 90 tablet, Rfl: 3    Estarylla 0.25-35 MG-MCG per tablet, Take 1 tablet by mouth Daily. (Patient not taking: Reported on 7/3/2025), Disp: , Rfl:         Physical Exam:     Vitals:    07/03/25 1331   Weight: 107 kg (236 lb)   Height: 157.3 cm (61.93\")   PainSc: 6    PainLoc: Neck          General: Alert and oriented, No acute distress.   HEENT: Normocephalic, atraumatic.   Cardiovascular: No gross edema  Respiratory: Respirations are non-labored    Cervical Spine:   No masses or atrophy  Range of motion - Flexion normal. Extension normal. Lateral rotation normal.   Palpation -TTP right occiput  Spurling's - negative   Neck and bilateral trapezius are tender to palpation   tender to palpation along the bilateral periscapular borders    Thoracic " Spine:   Inspection: no gross abnormality  Paraspinal muscle palpation: TTP right levator scapula, trapezius  Spinous process palpation: nontender         Motor Exam:    Strength: Rate on 1-5 scale Right Left    C5-Elbow flexion, Deltoid 5/5  5/5    C6-Wrist extension 5/5  5/5    C7- Elbow / finger extension 5/5  5/5    C8- Finger flexion 5/5  5/5    T1- Intrinsics hand 5/5  5/5       Sensory Exam: Altered sensation bilateral ulnar nerve distribution       Neurologic: Cranial Nerves II-XII are grossly intact.   Comer’s -negative bilaterally      Psychiatric: Cooperative.   Gait: Normal   Assistive Devices: None    Imaging Studies:   No results found for this or any previous visit.        Independent review of radiographic imaging:     Impression & Plan:       2025: Roberta Casillas is a 45 y.o. female with past medical history significant for follicular thyroid cancer status post thyroidectomy, GERD, migraines,  who presents to the pain clinic for evaluation and treatment of chronic right neck pain, chronic left shoulder pain, chronic bilateral hand numbness and tingling.  Cervical MRI without high-grade stenosis.  Evaluation consistent with right levator scapula syndrome, intrinsic left shoulder pathology, bilateral ulnar neuropathy.  I will refer to physical therapy with dry needling for her right levator scapula syndrome.  Additionally will prescribe baclofen, nortriptyline.  Can consider cervical epidural steroid injection if increasing suspicion for cervical radiculopathy.  3/3/2025: Excellent relief from conservative measures.  Continue nortriptyline, baclofen, PT..  Tolerating medication without side effects.  2025: Refill nortriptyline.  Will plan for bilateral thoracic and cervical trigger point steroid injections.    1. Neck pain    2. Right-sided levator scapulae syndrome    3. Ulnar neuropathy of both upper extremities    4. Chronic pain in left shoulder            PLAN:  1.  Medication Recommendations: Recommend Voltaren topical, NSAIDs, Tylenol.  Can trial turmeric 500 mg twice daily if NSAID contraindicated.  -Continue baclofen  -Continue nortriptyline nightly; will refill    2. Physical Therapy: Continue PT/HEP    3. Psychological: defer    4. Complementary and alternative (CAM) Therapies:     5. Labs/Diagnostic studies: None indicated     6. Imagin. Interventions:   Schedule bilateral cervical and thoracic trigger point steroid injections.  She does have an allergy to lidocaine which causes hives. I had an in-depth discussion with the patient regarding the risk of procedure including bleeding, infection, damage to surrounding structures, paralysis.  We discussed the potential adverse effects of corticosteroid injection including flushing of the face, lipodystrophy, skin discoloration, elevated blood glucose, increased blood pressure.  Risks of frequent steroid administration includes weight gain, hormonal changes, mood changes, and osteoporosis.     8. Referrals:     9. Records: Neurosurgery notes reviewed    10. Lifestyle goals:    Follow-up 3 months      [facility]  Jenn Gonzales PA-C          Quality Metrics:

## 2025-07-21 LAB
NCCN CRITERIA FLAG: NORMAL
TYRER CUZICK SCORE: 14.1

## 2025-07-22 ENCOUNTER — DOCUMENTATION (OUTPATIENT)
Dept: PAIN MEDICINE | Facility: CLINIC | Age: 45
End: 2025-07-22

## 2025-07-22 ENCOUNTER — OUTSIDE FACILITY SERVICE (OUTPATIENT)
Dept: PAIN MEDICINE | Facility: CLINIC | Age: 45
End: 2025-07-22
Payer: COMMERCIAL

## 2025-07-22 NOTE — PROGRESS NOTES
[facility]  [address] [place] [address]        PROCEDURE: Trigger Point Injections  MUSCLES INJECTED: bilateral trapwzius, rhombioid major, rhomboid minor  PRE-OP DIAGNOSIS: myalgia  POST-OP DIAGNOSIS: Same    ANTIPLATELET/ANTICOAGULANT STOP DATE: Discussed with patient and managed according to [place] guidelines    CONSENT: Risks, benefits and options were explained to the patient, all questions were answered and written informed consent was obtained.    ANESTHESIA: None    PROCEDURE NOTE:  After timeout was performed, the patient was placed in the prone position with all pressure points padded and the selected side upward. The areas of maximal muscle tenderness and spasm were identified and marked. The skin was prepped with chlorhexidine. Then a 27 gauge 1.5 inch needle was inserted into the trigger point. After negative aspiration, 1 mL of a mixture containing 5 cc 1% lidocaine, 3 cc 0.5% bupivacaine, 30 mg Toradol, 40 mg methylprednisolone was injected into each trigger point for a total of 10 cc.  Pressure was held and a dressing placed.     EBL: None    COMPLICATIONS: None    The patient was monitored until meeting appropriate discharge criteria. Vital signs remained stable. There were no immediate complications and the patient tolerated the procedure well. Sensory and motor exam was unchanged from baseline. The patient received written discharge instructions prior to discharge.    FOLLOW UP:     ADDITIONAL NOTES:     [facility]    Shahbaz Marshall MD    Codes:  [id number]

## 2025-07-23 ENCOUNTER — LAB (OUTPATIENT)
Dept: LAB | Facility: HOSPITAL | Age: 45
End: 2025-07-23
Payer: COMMERCIAL

## 2025-07-23 ENCOUNTER — HOSPITAL ENCOUNTER (OUTPATIENT)
Dept: MAMMOGRAPHY | Facility: HOSPITAL | Age: 45
Discharge: HOME | End: 2025-07-23
Payer: COMMERCIAL

## 2025-07-23 DIAGNOSIS — C73 FOLLICULAR THYROID CANCER: ICD-10-CM

## 2025-07-23 DIAGNOSIS — Z12.31 BREAST CANCER SCREENING BY MAMMOGRAM: ICD-10-CM

## 2025-07-23 DIAGNOSIS — E89.0 POSTOPERATIVE HYPOTHYROIDISM: ICD-10-CM

## 2025-07-23 PROCEDURE — 86800 THYROGLOBULIN ANTIBODY: CPT

## 2025-07-23 PROCEDURE — 84443 ASSAY THYROID STIM HORMONE: CPT

## 2025-07-23 PROCEDURE — 36415 COLL VENOUS BLD VENIPUNCTURE: CPT

## 2025-07-23 PROCEDURE — 84432 ASSAY OF THYROGLOBULIN: CPT

## 2025-07-23 PROCEDURE — 84439 ASSAY OF FREE THYROXINE: CPT

## 2025-07-23 PROCEDURE — 77063 BREAST TOMOSYNTHESIS BI: CPT

## 2025-07-23 PROCEDURE — 77067 SCR MAMMO BI INCL CAD: CPT

## 2025-07-24 LAB
T4 FREE SERPL-MCNC: 1.84 NG/DL (ref 0.92–1.68)
TSH SERPL DL<=0.05 MIU/L-ACNC: 0.21 UIU/ML (ref 0.27–4.2)

## 2025-07-25 LAB
THYROGLOB AB SERPL-ACNC: <1 IU/ML (ref 0–0.9)
THYROGLOB SERPL-MCNC: <0.1 NG/ML (ref 1.5–38.5)

## 2025-07-30 ENCOUNTER — OFFICE VISIT (OUTPATIENT)
Dept: ENDOCRINOLOGY | Facility: CLINIC | Age: 45
End: 2025-07-30
Payer: COMMERCIAL

## 2025-07-30 VITALS
BODY MASS INDEX: 42.88 KG/M2 | WEIGHT: 233 LBS | OXYGEN SATURATION: 99 % | DIASTOLIC BLOOD PRESSURE: 61 MMHG | HEART RATE: 77 BPM | SYSTOLIC BLOOD PRESSURE: 101 MMHG | HEIGHT: 62 IN

## 2025-07-30 DIAGNOSIS — C73 FOLLICULAR THYROID CANCER: Primary | ICD-10-CM

## 2025-07-30 DIAGNOSIS — E89.0 POSTOPERATIVE HYPOTHYROIDISM: ICD-10-CM

## 2025-07-30 RX ORDER — LEVOTHYROXINE SODIUM 112 MCG
112 TABLET ORAL DAILY
Qty: 30 TABLET | Refills: 11 | Status: SHIPPED | OUTPATIENT
Start: 2025-07-30 | End: 2026-07-30

## 2025-07-30 NOTE — PROGRESS NOTES
Follicular thyroid cancer (Follow up)    Subjective   Roberta Casillas is a 45 y.o. female. 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 Patient is s/p adjuvant I-131 therapy, which she received on 03/25/2021 (100 mCi). TB uptake on the WBS was seen.   12/17/2021 patient underwent WBS with hypothyroid preparation - negative, Tg is 0.1  12/28/22 WBS repeated and negative, TG < 0.1     She presented for routine follow-up, had labs done recently. No complaints. She had borderline glucose and discussed GLP-1 agonist with her PCP. She had question about any contraindications with thyroid cancer dx.     C/o heat intolerance.     Medications:    Current Outpatient Medications:     baclofen (LIORESAL) 10 MG tablet, Take 1-2 tabs per day as needed for muscle spasm., Disp: 90 tablet, Rfl: 0    fluticasone (FLONASE) 50 MCG/ACT nasal spray, Administer 2 sprays into the nostril(s) as directed by provider Daily., Disp: , Rfl:     lansoprazole (PREVACID) 30 MG capsule, Take 1 capsule by mouth Daily., Disp: , Rfl:     levocetirizine (XYZAL) 5 MG tablet, Take 1 tablet by mouth Daily., Disp: , Rfl:     meclizine (ANTIVERT) 25 MG tablet, TAKE 1 TABLET BY MOUTH EVERY 6 TO 8 HOURS AS NEEDED AS DIRECTED FOR DIZZINESS, Disp: , Rfl:     meloxicam (MOBIC) 15 MG tablet, TAKE 1 TABLET BY MOUTH DAILY (Patient taking differently: Take 1 tablet by mouth As Needed.), Disp: 30 tablet, Rfl: 1    methocarbamol (ROBAXIN) 500 MG tablet, Take 1 tablet by mouth As Needed., Disp: , Rfl: 0    Norgestim-Eth Estrad Triphasic (ORTHO TRI-CYCLEN, 28, PO), Take  by mouth., Disp: , Rfl:     nortriptyline (PAMELOR) 25 MG capsule, Take 1 capsule by mouth At Night As Needed for Sleep., Disp: 60 capsule, Rfl: 1    RA VITAMIN D-3 125 MCG (5000 UT) capsule, Take 1 capsule by mouth Daily., Disp: , Rfl:  "0    sertraline (ZOLOFT) 50 MG tablet, Take 1 tablet by mouth Daily., Disp: , Rfl:     Synthroid 125 MCG tablet, TAKE 1 TABLET BY MOUTH DAILY, Disp: 90 tablet, Rfl: 3      Review of Systems   Constitutional:  Positive for diaphoresis, fatigue and unexpected weight gain. Negative for chills and fever.   HENT:  Negative for congestion, sore throat and swollen glands.    Eyes:  Positive for itching.   Respiratory:  Negative for cough.    Cardiovascular:  Negative for chest pain.   Gastrointestinal:  Negative for abdominal pain, nausea and vomiting.   Endocrine: Positive for heat intolerance.   Genitourinary:  Negative for dysuria.   Musculoskeletal:  Negative for myalgias and neck pain.   Skin:  Negative for rash.   Neurological:  Positive for weakness. Negative for numbness.   All other systems reviewed and are negative.      Objective   Blood pressure 101/61, pulse 77, height 157.5 cm (62\"), weight 106 kg (233 lb), last menstrual period 2025, SpO2 99%. Body mass index is 42.62 kg/m².  Physical Exam  Vitals reviewed.   Constitutional:       Appearance: Normal appearance. She is obese.   Cardiovascular:      Rate and Rhythm: Normal rate and regular rhythm.      Pulses: Normal pulses.      Heart sounds: Normal heart sounds.   Pulmonary:      Effort: Pulmonary effort is normal.      Breath sounds: Normal breath sounds.   Musculoskeletal:         General: No swelling.   Lymphadenopathy:      Cervical: No cervical adenopathy.   Neurological:      Mental Status: She is alert and oriented to person, place, and time.   Psychiatric:         Mood and Affect: Mood normal.         Thought Content: Thought content normal.          Post-operative Neck Ultrasound  [facility]    PT: Roberta Casillas  : 1980  Date:  25   Indication: History of thyroid cancer.    Technique: Real time high resolution imaging of the anterior neck was performed in longitudinal and transverse planes.    Findings: "   Compartment / Level I  (Submandibular): no abnormal lymph nodules bilaterally  Compartment / Level II  (Suprahyoid parajugular): no abnormal lymph nodules bilaterally  Compartment / Level III  (Infrahyoid supracricoid parajugular): no abnormal lymph nodules bilaterally  Compartment / Level IV (Infracricoid parajugular): no abnormal lymph nodules bilaterally  Compartment / Level V (Posterior cervical triangle): no abnormal lymph nodules bilaterally  Compartment / Level VI (Central compartment): no abnormal lymph nodules identified  Compartment / Level VII (Substernal space): no abnormal lymph nodules bilaterally    Impression: The structures of the neck are shifted medially as expected post thyroidectomy. The thyroid bed is unremarkable.   No abnormal lymph nodes were identified in surgical anatomic compartments I - VII.      Recommendation: Repeat Neck US recommended as clinically indicated.      Signed: Sherita Rivera MD         Results for orders placed or performed in visit on 07/23/25   TSH    Collection Time: 07/23/25  2:17 PM    Specimen: Blood   Result Value Ref Range    TSH 0.215 (L) 0.270 - 4.200 uIU/mL   T4, Free    Collection Time: 07/23/25  2:17 PM    Specimen: Blood   Result Value Ref Range    Free T4 1.84 (H) 0.92 - 1.68 ng/dL   Thyroglobulin Antibody and Thyroglobulin, EBONY or LC/MS-MS    Collection Time: 07/23/25  2:17 PM    Specimen: Blood   Result Value Ref Range    Thyroglobulin Ab <1.0 0.0 - 0.9 IU/mL   Thyroglobulin By EBONY    Collection Time: 07/23/25  2:17 PM    Specimen: Blood   Result Value Ref Range    THYROGLOBULIN BY EBONY <0.1 (L) 1.5 - 38.5 ng/mL           Assessment/Plan:    Problem List Items Addressed This Visit          Endocrine and Metabolic    Postoperative hypothyroidism       Hematology and Neoplasia    Follicular thyroid cancer - Primary    Overview   Follicular thyroid carcinoma - 1.8 cm with vascular invasion.   Date TSH Thyroglobulin WBS CT/PET scan ultrasound I-131 therapy  Comments   3/25/2021  14.9 TB uptake   100 mCi    7/22/2021 0.2, high fT4 0.1     137-->125 mcg    12/21/2021  0.1 Negative       08/18/22 0.8 < 0.1        12/28/22 40 <0.1 (stimulated) negative       11/13/24    Negative chest CT w/o contrast                                                       Follicular thyroid carcinoma. 1.8 cm with vascular invasion. S/p I-131 therapy 100 mCi in 3/25/2021, WBS showed thyroid  uptake.   12/2021 WBS was negative and Stimulated Tg level is 0.1, favorable results. No evidence of recurrence.   12/28/22 - WBS is negative with Tg < 0.1. Thyrogen stimulated  11/2024 CT chest no contrast for initial evaluation - normal  7/2025 u/s is negative. TG level is < 0.1  Plan for WBS in 2027.      Synthroid  125 mcg decreased to 112 mcg.. TFT showed borderline high thyroid function and she has a symptoms of heat intolerance.      Follow-up in 10 -12 months